# Patient Record
Sex: FEMALE | Race: WHITE | NOT HISPANIC OR LATINO | Employment: OTHER | URBAN - METROPOLITAN AREA
[De-identification: names, ages, dates, MRNs, and addresses within clinical notes are randomized per-mention and may not be internally consistent; named-entity substitution may affect disease eponyms.]

---

## 2021-04-06 ENCOUNTER — AMBULATORY - HEALTHEAST (OUTPATIENT)
Dept: SURGERY | Facility: CLINIC | Age: 71
End: 2021-04-06

## 2021-04-06 DIAGNOSIS — Z11.59 ENCOUNTER FOR SCREENING FOR OTHER VIRAL DISEASES: ICD-10-CM

## 2021-04-22 ENCOUNTER — RECORDS - HEALTHEAST (OUTPATIENT)
Dept: ADMINISTRATIVE | Facility: OTHER | Age: 71
End: 2021-04-22

## 2021-04-22 DIAGNOSIS — Z11.59 ENCOUNTER FOR SCREENING FOR OTHER VIRAL DISEASES: Primary | ICD-10-CM

## 2021-04-22 ASSESSMENT — MIFFLIN-ST. JEOR: SCORE: 1463.88

## 2021-04-25 DIAGNOSIS — Z11.59 ENCOUNTER FOR SCREENING FOR OTHER VIRAL DISEASES: ICD-10-CM

## 2021-04-25 LAB
SARS-COV-2 RNA RESP QL NAA+PROBE: NORMAL
SPECIMEN SOURCE: NORMAL

## 2021-04-25 PROCEDURE — U0003 INFECTIOUS AGENT DETECTION BY NUCLEIC ACID (DNA OR RNA); SEVERE ACUTE RESPIRATORY SYNDROME CORONAVIRUS 2 (SARS-COV-2) (CORONAVIRUS DISEASE [COVID-19]), AMPLIFIED PROBE TECHNIQUE, MAKING USE OF HIGH THROUGHPUT TECHNOLOGIES AS DESCRIBED BY CMS-2020-01-R: HCPCS | Performed by: ORTHOPAEDIC SURGERY

## 2021-04-25 PROCEDURE — U0005 INFEC AGEN DETEC AMPLI PROBE: HCPCS | Performed by: ORTHOPAEDIC SURGERY

## 2021-04-26 LAB
LABORATORY COMMENT REPORT: NORMAL
SARS-COV-2 RNA RESP QL NAA+PROBE: NEGATIVE
SPECIMEN SOURCE: NORMAL

## 2021-04-27 ENCOUNTER — COMMUNICATION - HEALTHEAST (OUTPATIENT)
Dept: SCHEDULING | Facility: CLINIC | Age: 71
End: 2021-04-27

## 2021-04-28 ENCOUNTER — RECORDS - HEALTHEAST (OUTPATIENT)
Dept: ADMINISTRATIVE | Facility: OTHER | Age: 71
End: 2021-04-28

## 2021-04-28 ENCOUNTER — SURGERY - HEALTHEAST (OUTPATIENT)
Dept: SURGERY | Facility: CLINIC | Age: 71
End: 2021-04-28
Payer: COMMERCIAL

## 2021-04-28 ENCOUNTER — ANESTHESIA - HEALTHEAST (OUTPATIENT)
Dept: SURGERY | Facility: CLINIC | Age: 71
End: 2021-04-28

## 2021-04-28 ASSESSMENT — MIFFLIN-ST. JEOR: SCORE: 1465.69

## 2021-06-05 VITALS — WEIGHT: 221 LBS | BODY MASS INDEX: 40.16 KG/M2

## 2021-06-05 VITALS — BODY MASS INDEX: 38.72 KG/M2 | HEIGHT: 63 IN

## 2021-06-05 VITALS — WEIGHT: 218.6 LBS | BODY MASS INDEX: 39.73 KG/M2

## 2021-06-05 VITALS — HEIGHT: 62 IN

## 2021-06-17 NOTE — ANESTHESIA CARE TRANSFER NOTE
Last vitals:   Vitals:    04/28/21 1316   BP: 98/54   Pulse: 68   Resp: 14   Temp: 36.5  C (97.7  F)   SpO2: 96%     Patient's level of consciousness is awake  Spontaneous respirations: yes  Maintains airway independently: yes  Dentition unchanged: yes  Oropharynx: oropharynx clear of all foreign objects    QCDR Measures:  ASA# 20 - Surgical Safety Checklist: WHO surgical safety checklist completed prior to induction    PQRS# 430 - Adult PONV Prevention: 4558F - Pt received => 2 anti-emetic agents (different classes) preop & intraop  ASA# 8 - Peds PONV Prevention: 4558F - Pt received => 2 anti-emetic agents (different classes) preop & intraop  PQRS# 424 - Rosy-op Temp Management: 4559F - At least one body temp DOCUMENTED => 35.5C or 95.9F within required timeframe  PQRS# 426 - PACU Transfer Protocol: - Transfer of care checklist used  ASA# 14 - Acute Post-op Pain: ASA14B - Patient did NOT experience pain >= 7 out of 10

## 2021-06-17 NOTE — ANESTHESIA PREPROCEDURE EVALUATION
Anesthesia Evaluation      Patient summary reviewed   No history of anesthetic complications     Airway   Mallampati: II  Neck ROM: full   Pulmonary - negative ROS and normal exam    breath sounds clear to auscultation                         Cardiovascular - negative ROS and normal exam  (+) hypertension, , hypercholesterolemia,     Rhythm: regular  Rate: normal,         Neuro/Psych - negative ROS     Endo/Other - negative ROS   (+) obesity,      GI/Hepatic/Renal - negative ROS           Dental - normal exam                        Anesthesia Plan  Planned anesthetic: spinal    Chart reviewed. Patient examined.    Spinal block procedure and its risks, including, but not limited to headache, nerve damage, bleeding, infection, back pain, low blood pressure, cardiorespiratory arrest, and block failure were discussed fully with patient.  Opportunity for questions given.   Informed consent obtained. Desires to proceed.  Routine sedation meds.       ASA 3     Anesthetic plan and risks discussed with: patient

## 2021-06-17 NOTE — ANESTHESIA POSTPROCEDURE EVALUATION
Patient: Luli Hobbs  Procedure(s):  RIGHT TOTAL HIP ARTHROPLASTY DIRECT ANTERIOR APPROACH (Right)  Anesthesia type: spinal    Patient location: PACU  Last vitals:   Vitals Value Taken Time   /72 04/28/21 1350   Temp 36.5  C (97.7  F) 04/28/21 1316   Pulse 60 04/28/21 1354   Resp 9 04/28/21 1354   SpO2 94 % 04/28/21 1354   Vitals shown include unvalidated device data.  Post vital signs: stable  Level of consciousness: awake and responds to simple questions  Post-anesthesia pain: pain controlled  Post-anesthesia nausea and vomiting: no  Pulmonary: unassisted, return to baseline  Cardiovascular: stable and blood pressure at baseline  Hydration: adequate  Anesthetic events: no

## 2021-06-17 NOTE — ANESTHESIA PROCEDURE NOTES
Spinal Block    Patient location during procedure: OR  Start time: 4/28/2021 11:41 AM  End time: 4/28/2021 11:43 AM  Reason for block: primary anesthetic    Staffing:  Performing  Anesthesiologist: Wan Christianosn MD    Preanesthetic Checklist  Completed: patient identified, risks, benefits, and alternatives discussed, timeout performed, consent obtained, at patient's request, airway assessed, oxygen available, suction available, emergency drugs available and hand hygiene performed  Spinal Block  Patient position: sitting  Prep: ChloraPrep  Patient monitoring: blood pressure, continuous pulse ox, cardiac monitor and heart rate  Approach: midline  Location: L3-4  Injection technique: single-shot  Needle type: pencil-tip   Needle gauge: 24 G

## 2022-01-12 VITALS — HEIGHT: 63 IN | BODY MASS INDEX: 38.73 KG/M2 | WEIGHT: 218.6 LBS

## 2022-05-09 ENCOUNTER — TELEPHONE (OUTPATIENT)
Dept: OTHER | Facility: CLINIC | Age: 72
End: 2022-05-09
Payer: COMMERCIAL

## 2022-05-09 NOTE — TELEPHONE ENCOUNTER
[facility]    Who is the name of the provider?:  New      What is the location you see this provider at?: [place]    Reason for call:  Requesting new appointment with Dr. Doe for lipedema.      Can we leave a detailed message on this number?  YES

## 2022-05-09 NOTE — TELEPHONE ENCOUNTER
Please arrange for new patient consult with Dr. Doe at next available.    Appt Note:  Self-referral for lipedema.

## 2022-05-10 NOTE — TELEPHONE ENCOUNTER
Future Appointments   Date Time Provider Department Center   6/2/2022 11:00 AM Ben Doe MD [facility]

## 2022-06-02 ENCOUNTER — OFFICE VISIT (OUTPATIENT)
Dept: OTHER | Facility: CLINIC | Age: 72
End: 2022-06-02
Attending: INTERNAL MEDICINE
Payer: COMMERCIAL

## 2022-06-02 VITALS
HEART RATE: 73 BPM | BODY MASS INDEX: 39.23 KG/M2 | HEIGHT: 63 IN | WEIGHT: 221.4 LBS | SYSTOLIC BLOOD PRESSURE: 132 MMHG | OXYGEN SATURATION: 97 % | DIASTOLIC BLOOD PRESSURE: 84 MMHG

## 2022-06-02 DIAGNOSIS — I10 BENIGN ESSENTIAL HYPERTENSION: ICD-10-CM

## 2022-06-02 DIAGNOSIS — I89.0 LYMPHEDEMA: ICD-10-CM

## 2022-06-02 DIAGNOSIS — E66.812 CLASS 2 OBESITY WITHOUT SERIOUS COMORBIDITY WITH BODY MASS INDEX (BMI) OF 39.0 TO 39.9 IN ADULT, UNSPECIFIED OBESITY TYPE: ICD-10-CM

## 2022-06-02 DIAGNOSIS — I83.893 VARICOSE VEINS OF BILATERAL LOWER EXTREMITIES WITH OTHER COMPLICATIONS: ICD-10-CM

## 2022-06-02 DIAGNOSIS — R60.9 LIPEDEMA: Primary | ICD-10-CM

## 2022-06-02 PROCEDURE — 99205 OFFICE O/P NEW HI 60 MIN: CPT | Performed by: INTERNAL MEDICINE

## 2022-06-02 PROCEDURE — G0463 HOSPITAL OUTPT CLINIC VISIT: HCPCS

## 2022-06-02 RX ORDER — GLUCOSAMINE/CHONDR SU A SOD 750-600 MG
2 TABLET ORAL DAILY
COMMUNITY

## 2022-06-02 RX ORDER — METOPROLOL SUCCINATE 200 MG/1
200 TABLET, EXTENDED RELEASE ORAL DAILY
COMMUNITY
Start: 2022-02-03

## 2022-06-02 RX ORDER — ACETAMINOPHEN 325 MG/1
325-650 TABLET ORAL
COMMUNITY
Start: 2021-04-28

## 2022-06-02 RX ORDER — TRAMADOL HYDROCHLORIDE 50 MG/1
25-50 TABLET ORAL
COMMUNITY
Start: 2021-04-29

## 2022-06-02 RX ORDER — PANTOPRAZOLE SODIUM 40 MG/1
40 TABLET, DELAYED RELEASE ORAL
COMMUNITY
Start: 2022-05-06

## 2022-06-02 RX ORDER — TRIAMTERENE/HYDROCHLOROTHIAZID 37.5-25 MG
1 TABLET ORAL EVERY MORNING
COMMUNITY
Start: 2022-02-03

## 2022-06-02 RX ORDER — ROSUVASTATIN CALCIUM 5 MG/1
5 TABLET, COATED ORAL DAILY
COMMUNITY
Start: 2022-02-03

## 2022-06-02 NOTE — PATIENT INSTRUCTIONS
Please see edema therapist referral done for MLD, Compression, velcro wraps etc     Paleo diet  /anti inflammatory diet    Lymphatic yoga, pool , aquatic exercises, pilates, walk etc    Lose weight  aim for 2-3 pounds per month     Please see sleep clinic and undergo formal sleep study     Take supplements , multivitamin, Vitamin D 2000 units , selenium one pill a day OTC     RTC 6 months

## 2022-06-02 NOTE — PROGRESS NOTES
"Patient is here to discuss self-referral for lipedema.    /84 (BP Location: Right arm, Patient Position: Chair, Cuff Size: Adult Large)   Pulse 73   Ht 5' 3\" (1.6 m)   Wt 221 lb 6.4 oz (100.4 kg)   SpO2 97%   BMI 39.22 kg/m      Questions patient would like addressed today are: N/A.    Refills are needed: N/A    Has homecare services and agency name:  Tena Goodwin  "

## 2022-06-02 NOTE — PROGRESS NOTES
[facility] INITIAL VASCULAR MEDICINE CONSULT    ( New patient visit)       PRIMARY HEALTH CARE PROVIDER:  Rica Christianson MD      REFERRING HEALTH CARE PROVIDER;  Rica Christianson MD      REASON FOR CONSULT: Evaluation and management of Lipedema.      HPI: Luli Hobbs is a 72 year old very pleasant female with past medical history of obesity, bilateral lower extremity varicose veins left worse than right side underwent RF ablation at [facility] few years ago, hypertension, history of breast cancer status postlumpectomy then followed by radiation, chronic back issues gives a history of bilateral leg heaviness, fatigue, tiredness.    She noticed disproportionately larger thighs, buttocks compared to rest of the body starting early 20s and she has no children and she progressively gained weight and again body habitus changed after menopause and now progressively worsening size, she etc.    Few years ago she was seen in by a vascular specialist at the [facility] system underwent venous duplex no DVT underwent venous competency studies then followed by SSV/varicose vein ablation of left lower extremity.  She was given compression stockings.  Her upper arms also increased size with the saggy skin and having difficulty in muscle toning.  Small tender nodules in upper arms lower abdomen, thighs hips and buttocks area.    She also has a sleep issues but no formal sleep study was done    No history of for DVT    She is new to this clinic reviewed available extensive records in the [facility] Care Everywhere and updated chart      PAST MEDICAL HISTORY  Past Medical History:   Diagnosis Date     Allergic rhinitis      Allergic rhinitis, cause unspecified      Arthritis      Breast cancer (H) 01/01/2000     Esophageal stricture      Hypertension      Lipodermatosclerosis of left lower extremity      Lymphedema      Malignant neoplasm of breast (female), unspecified site 01/01/2000    had  left lumpectomy and radiation     Morbid obesity (H)      Multiple environmental allergies     Autoimmune disorder causing hypersensitivity     Obesity      Peptic ulcer      Spondylolysis     lumbar     Varicose vein of leg        CURRENT MEDICATIONS  acetaminophen (TYLENOL) 325 MG tablet, Take 325-650 mg by mouth 1 capsule daily.  CALCIUM 500 + D 500-200 MG-IU OR TABS, one daily  cholecalciferol 25 MCG (1000 UT) TABS, Take 2,000 Units by mouth  Ginkgo Biloba 120 MG TABS, Take 2 tablets by mouth daily  HEMP OIL OR EXTRACT OR OTHER CBD CANNABINOID, NOT MEDICAL CANNABIS,, Take 25 mg by mouth As needed  metoprolol succinate ER (TOPROL XL) 200 MG 24 hr tablet, Take 200 mg by mouth  MULTIVITAMIN/MULTIMINERAL TABS   OR, 1 TABLET DAILY  OMEGA 3 550 MG OR CAPS, takes one daily  pantoprazole (PROTONIX) 40 MG EC tablet, Take 40 mg by mouth As needed  rosuvastatin (CRESTOR) 5 MG tablet,   ST FULLER WORT 350 MG OR CAPS, unsure of mg strength, takes one daily  traMADol (ULTRAM) 50 MG tablet, Take 25-50 mg by mouth  triamterene-HCTZ (MAXZIDE-25) 37.5-25 MG tablet, Take 1 tablet by mouth every morning  Turmeric Curcumin 500 MG CAPS, Take 1 capsule by mouth  VITAMIN E 200 UNIT OR CAPS, takes one daily  ZYRTEC D, ONE BID    No current facility-administered medications on file prior to visit.      PAST SURGICAL HISTORY:  Past Surgical History:   Procedure Laterality Date     ABLATION SAPHENOUS VEIN W/ RFA Left 2019     CL AFF SURGICAL PATHOLOGY  1997    lipoma from abdomen removed     ESOPHAGOSCOPY, GASTROSCOPY, DUODENOSCOPY (EGD), COMBINED  2019     FOOT ARTHRODESIS, SUBTALAR       HC EXCISION BREAST LESION, OPEN >=1  2000     HC RECONSTR NOSE+SIVAKUMAR SEPTAL REPAIR  2003    cleaned out sinus passages     INSERT INTRACORONARY STENT Right 2018    Right Thigh     LUMPECTOMY BREAST       RELEASE CARPAL TUNNEL Bilateral      SEPTOPLASTY       TONSILLECTOMY       TONSILLECTOMY       ZZC FOOT/TOES SURGERY PROC UNLISTED  2000    bone spur  removed, toe straightened     [facility] TOTAL HIP ARTHROPLASTY Right 2021    Procedure: RIGHT TOTAL HIP ARTHROPLASTY DIRECT ANTERIOR APPROACH;  Surgeon: Gomez Barnett MD;  Location: [facility] Main OR;  Service: Orthopedics     RUST NCS MOTOR W/O F-WAVE, EACH NERVE      bilateral       ALLERGIES     Allergies   Allergen Reactions     Codeine Nausea and Vomiting       FAMILY HISTORY  Family History   Problem Relation Age of Onset     Musculoskeletal Disorder Father         dementia     Cancer Maternal Grandfather         skin cancer       VASCULAR FAMILY HISTORY  1st order relative with atherosclerotic PAD: No  1st order relative with AAA: No  Family history of Familial Hyperlipidemia No  Family History of Hypercoagulable state:No    VASCULAR RISK FACTORS  1. Diabetes:No   2. Smoking: quit smoking some time ago.  3. HTN: controlled  4.Hyperlipidemia: Yes - controlled      SOCIAL HISTORY  Social History     Socioeconomic History     Marital status: Single     Spouse name: Not on file     Number of children: 0     Years of education: Not on file     Highest education level: Not on file   Occupational History     Occupation: artist      Employer: [employer]   Tobacco Use     Smoking status: Former Smoker     Packs/day: 1.50     Years: 2.00     Pack years: 3.00     Types: Cigarettes     Quit date: 1972     Years since quittin.4     Smokeless tobacco: Never Used   Substance and Sexual Activity     Alcohol use: Yes     Alcohol/week: 14.0 standard drinks     Comment: occ     Drug use: Never     Sexual activity: Not Currently   Other Topics Concern     Not on file   Social History Narrative     Not on file     Social Determinants of Health     Financial Resource Strain: Not on file   Food Insecurity: Not on file   Transportation Needs: Not on file   Physical Activity: Not on file   Stress: Not on file   Social Connections: Not on file   Intimate Partner Violence: Not on file   Housing  "Stability: Not on file       ROS:   General: No change in weight, sleep or appetite.  Normal energy.  No fever or chills  Chronic sleep issues, she lives alone, she thinks she snores probably due to congestion?   She has a daytime fatigue tiredness  Eyes: Negative for vision changes or eye problems  ENT: No problems with ears, nose or throat.  No difficulty swallowing.  Resp: No coughing, wheezing or shortness of breath  CV: No chest pains or palpitations  GI: No nausea, vomiting,  heartburn, abdominal pain, diarrhea, constipation or change in bowel habits  : No urinary frequency or dysuria, bladder or kidney problems  Musculoskeletal: Disproportionately larger thighs, buttocks, arms compared to rest of the body and it is progressively worsening, both legs feels heavy, fatigue and tired  Neurologic: No headaches, numbness, tingling, weakness, problems with balance or coordination  Psychiatric: No problems with anxiety, depression or mental health  Heme/immune/allergy: No history of bleeding or clotting problems or anemia.  No allergies or immune system problems  Endocrine: No history of thyroid disease, diabetes or other endocrine disorders  Skin: No rashes,worrisome lesions or skin problems  Vascular: Bilateral thighs, buttocks, legs disproportionately larger and feels heavy, tight and tired  History of varicose veins underwent left leg venous ablation few years ago  No history of DVT    EXAM:  /84 (BP Location: Right arm, Patient Position: Chair, Cuff Size: Adult Large)   Pulse 73   Ht 5' 3\" (1.6 m)   Wt 221 lb 6.4 oz (100.4 kg)   SpO2 97%   BMI 39.22 kg/m    In general, the patient is a pleasant female in no apparent distress.    HEENT: NC/AT.  PERRLA.  EOMI.  Sclerae white, not injected.  Nares clear.  Pharynx without erythema or exudate.  Dentition intact.    Neck: No adenopathy.  No thyromegaly. Carotids +2/2 bilaterally without bruits.  No jugular venous distension.   Heart: RRR. Normal S1, S2 " splits physiologically. No murmur, rub, click, or gallop. The PMI is in the 5th ICS in the midclavicular line. There is no heave.    Lungs: CTA.  No ronchi, wheezes, rales.  No dullness to percussion.   Abdomen: Soft, nontender, nondistended. No organomegaly. No AAA.  No bruits.   Extremities: Vascular:  She has a bilateral lower extremity varicose veins CEAP 3 CVI  Classical features of stage 2-3 Lipedema with lymphedema and venous insufficiency  Her swelling stops mainly at the wrist level bilaterally and also ankle level, positive cuff sign  Squaring of the toes, negative stemmers sign  No foot ulcers or leg ulcers  Palpable symmetrical peripheral pulses and no signs of arterial insufficiency    Labs:  LIPID RESULTS:  Lab Results   Component Value Date    CHOL 258 (H) 04/18/2006    HDL 67 04/18/2006     (H) 04/18/2006    TRIG 115 04/18/2006    CHOLHDLRATIO 3.8 04/18/2006         CBC RESULTS:  Lab Results   Component Value Date    HGB 11.7 (L) 04/28/2021       BMP RESULTS:  Lab Results   Component Value Date     04/18/2006    POTASSIUM 3.6 04/18/2006    CHLORIDE 102 04/18/2006    CO2 24 04/18/2006    ANIONGAP 13 04/18/2006     04/18/2006    BUN 12 04/18/2006    CR 0.80 04/18/2006    GFRESTIMATED 78 04/18/2006    GFRESTBLACK >90 04/18/2006    KEISHA 9.6 04/18/2006          THYROID RESULTS:  Lab Results   Component Value Date    TSH 2.73 04/18/2006       Procedures:   Impression         1.  Negative for DVT or EHIT.   2.  Successful closure of the left small saphenous vein.     Valeria Land MD   Vascular Surgery   JNONI/sls   D: 4/2/2019  T: 4/2/2019       This study was performed and interpreted by [facility]'s Vascular   Laboratory, a service accredited by the Intersocietal Accreditation   Commission (IAC) Vascular Testing/ICAVL, www.intersocietal.org/vascular.      Narrative    [facility]   [address]  [id number]      [email or web address]     VASCULAR ULTRASOUND REPORT     PATIENT NAME:  Luli Bazzi   YOB: 1950   IDENTIFICATION #:  [id number]   REFERRED BY:  Anselmo Iverson MD   LOCATION:  [facility]   DATE OF EXAM:  2019     NAME OF EXAM:  LEFT LOWER EXTREMITY VENOUS INSUFFICIENCY DUPLEX, LIMITED     CLINICAL HISTORY:  Follow up of left small saphenous vein RF ablation.     TECHNIQUE:  The left lower extremity veins were examined with gray-scale   ultrasound, color-flow and Doppler spectral analysis.  Compressibility of   veins by transducer pressure was used to evaluate presence/absence of   DVT/SVT at sites per exam specific protocol.  Assessment of venous flow   and competence was performed by Doppler spectral analysis and documented   at exam specific sites.  Venous insufficiency studies were performed with   the patient in an upright position.  Vein diameters were measured in   millimeters (mm) and reflux times if present were measured in seconds by   caliper method.     FINDINGS:  The popliteal vein on the left is patent with no evidence of   thrombus or EHIT. The left small saphenous vein is closed from the   junction to the mid distal calf.      ECHOCARDIOGRAM       LULI BAZZI   MRN:    [id number]         Accession#:   [id number]   :    1950 69 years Study Date:   2019 2:42:41 PM   Gender: F                  BP:           0/0 mmHg   Height: 160.00 cm          BSA:          1.98 mÂ    Weight: 96.00 kg           Tech:         VIPIN                              Referring MD: CECILE ALBERTO   Site:             [facility]   Reading Location: anw       Procedure: 2D, Color Doppler and Spectral Doppler.     Indication for study: Lower extremity edema   Cardiac Rhythm: Normal sinus.Study quality: Fair.     Final Impressions:      1. Normal left ventricular size, mildly increased wall thickness, normal global systolic function, calculated EF of 67 %.    2. No significant  valve disease detected.    3. The mitral valve is normal, mild mitral regurgitation.     Chamber Sizes and Function   Normal left ventricular size, mildly increased wall thickness, normal global systolic function, calculated EF of 67 %. Left atrial size is normal. Left atrial pressure is normal. Right ventricular cavity size is normal, global systolic RV function is normal. RV wall thickness is normal. The right atrium is normal. Right atrial volume index is 22 ml/mÂ . Right atrial area is 16 cmÂ . The pulmonary artery is of normal size and origin. The sinus of Valsalva is normal sized. The ascending aorta is normal sized. The aortic arch is normal.     Valves, RV Pressures and Diastolic Function     The aortic valve is normal in structure and trileaflet, no stenosis and no regurgitation. The mitral valve is normal in structure, mild mitral regurgitation. Normal diastolic function. The tricuspid valve is normal in structure. Tricuspid regurgitation is trace. The tricuspid regurgitant velocity is 2.0 m/s, the estimated right ventricular systolic pressure is 16 mmHg plus right atrial pressure. There is normal estimated pulmonary pressure by tricuspid regurgitation velocity and right atrial pressure. The pulmonic valve is normal. Trace pulmonic regurgitation.     Masses, Effusion, Shunts   There is no pericardial effusion. The inferior vena cava is normal sized, respiratory size variation greater than 50%. No left to right shunting was detected by limited color flow Doppler interrogation of the interatrial septum.     MEASUREMENTS AND CALCULATIONS     2-D Measurements and LV Function:     LVID (d) 4.0 cm Planimetered EF 67 %   LVID (s) 2.6 cm LV FS% (2D)     35 %   IVS (d)  1.3 cm LVOT diameter   1.8 cm   LVPW (d) 1.3 cm HR              84 bpm   Ao Sinus 3.3 cm LA Vol index    31 ml/m2   Asc Ao   3.5 cm RA Vol index    22 ml/m2   LA       3.7 cm RA area         16 cmÂ      Diastology:   Mitral          Tissue Doppler           Pulmonary veins   E Peak 1.1 m/s  e', Septum     0.09 m/s Pulm s          41.0 cm/s   A Peak 1.0 m/s  e', Lateral    0.09 m/s Pulm d          32.3 cm/s   E/A    1.1      E/e' Average   12.29    Pulm s/d ratio  1.27   DT     193 msec     Aortic Valve:     Vmax       1.9 m/s  KESHAWN (V)   1.82 cmÂ    VTI        0.37 m   KESHAWN (I)   1.80 cmÂ    LVOT V max 1.4 m/s  Max PG    15 mmHg   LVOT VTI   0.26 m   Mean PG   8 mmHg   SV         67 ml    Dim Index 0.71   SV index   34 ml/mÂ  CO        5.6 l/min                       CI        2.8 l/min/mÂ      Mitral Valve:     MVA      3.9 cmÂ    MV P 1/2 56 msec     Tricuspid Valve and estimated PA pressures:   TR Vmax 2.0 m/s TAPSE 3.1 cm   TR maxG 16 mmHg    Pulmonic Valve:   PV AT 90 msec       Electronically signed by Matty Nelson MD on 6/27/2019 5:09:20 PM.          Assessment and Plan:           1. 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 Lymphedema  3. Varicose veins of bilateral lower extremities with other complications s/p Left SSV RF,s/p  ablation 2019 at [facility] now CEAP 3 CVI    4. Class 2 obesity without serious comorbidity with body mass index (BMI) of 39.0 to 39.9 in adult, unspecified obesity type    This is a very pleasant 72-year-old female with history of multiple medical problems as delineated in HPI experiencing bilateral disproportionately large thighs, buttocks, upper arms with ongoing progressively worsening leg fatigue, tiredness, tightness and heaviness.  Her history and exam consistent with lipedema stage 2-3 with positive cuff sign and in fact triple disease ( Lipo-Phlebo lymphedema)  She has a known history of a bilateral lower extremity varicose veins and previously underwent left-sided leg venous ablation.  No history of a DVT.   She also has a features of lymphedema in both legs, squaring of the toes etc.  Many years ago she was seen and evaluated by edema therapist and currently using compression.    I had a lengthy discussion with  the patient regarding Lipedema and in her case complicated by venous insufficiency and lymphedema options of treatment and improving the lifestyle etc.    I have shared above diagram with the patient and suggested multimodality approach.  She took picture on her phone.    I will arrange referral to see edema therapist for compression, Velcro wraps, MLD, vibration,  possible pump therapy etc.    Anti-inflammatory/paleo diet suggested    Lymphatic yoga, aquatic exercises,  Pilates cycling etc.    Supplementation with multivitamin, vitamin C, vitamin D, selenium etc.    Go for formal sleep evaluation if documented sleep apnea consider using the CPAP machine or mouthguard if that is an option    Continues to do muscle toning exercises specifically quadriceps and upper arms area    Lose 2 to 3 pounds per month, consider intermittent fasting    We also discussed liposuction pros and cons.  She is not a good candidate for liposuction given triple disease and her age .     AVS with written instructions given    Follow-up in 6 months    - Lymphedema Therapy Referral; Future      More than 60 minutes spent on the date of the encounter doing chart review, history and exam, documentation, and further activities as noted above.  She is new to this clinic , reviewed available out side records in [facility] everywhere and updated chart     AVS with written instructions given.      Ben Doe MD, FAHA, FSVM, FNLA, FACP  Vascular Medicine  Clinical Lipidologist  Clinical Hypertension specialist

## 2022-09-02 ENCOUNTER — HOSPITAL ENCOUNTER (OUTPATIENT)
Dept: OCCUPATIONAL THERAPY | Facility: CLINIC | Age: 72
Discharge: HOME | End: 2022-09-02
Attending: INTERNAL MEDICINE
Payer: COMMERCIAL

## 2022-09-02 DIAGNOSIS — R60.9 LIPEDEMA: ICD-10-CM

## 2022-09-02 DIAGNOSIS — I89.0 LYMPHEDEMA: ICD-10-CM

## 2022-09-02 PROCEDURE — 97165 OT EVAL LOW COMPLEX 30 MIN: CPT | Mod: GO | Performed by: OCCUPATIONAL THERAPIST

## 2022-09-02 PROCEDURE — 97140 MANUAL THERAPY 1/> REGIONS: CPT | Mod: GO | Performed by: OCCUPATIONAL THERAPIST

## 2022-09-04 NOTE — ADDENDUM NOTE
Encounter addended by: Liang Gutierrez on: 9/4/2022 9:17 AM   Actions taken: Flowsheet accepted, Clinical Note Signed

## 2022-09-04 NOTE — PROGRESS NOTES
[facility]        OUTPATIENT OCCUPATIONAL THERAPY EDEMA EVALUATION  PLAN OF TREATMENT FOR OUTPATIENT REHABILITATION  (COMPLETE FOR INITIAL CLAIMS ONLY)  Patient's Last Name, First Name, Luli Patel                           Provider s Name:   [facility] Medical Record No.  [id number]     Start of Care Date:  09/02/22   Onset Date:  06/02/22 (chronic)   Type:  OT   Medical Diagnosis:  lymphedema   Therapy Diagnosis:  lymphedema Visits from SOC:  1                                     __________________________________________________________________________________   Plan of Treatment/Functional Goals:    Manual lymph drainage, Gradient compression bandaging, Fit for compression garment, Exercises, Precautions to prevent infection / exacerbation, Education, Skin care / precautions, Home management program development        GOALS  1. Goal description: Pt will report understandign differences between primary lymphedema/lipedema vs secondary lymphedema/phlebolymphedema vs cellulitis for safety and I with home management BLE lymphedema/lipedema       Target date: 11/25/22  2. Goal description: Tolerate gradient compression bandaging/wearing compression garments 23 hrs/day to prevent re-acccumulation of extracellular fluid for max reducitons needed to reduce risk infections and promote improvements in walking/mobility tasks       Target date: 11/25/22  3. Goal description: Demonstrate independence in applying gradient compression bandages for I building with home management of BLE lymphedema needed to aide improveemnts in mobility engagement       Target date: 11/25/22  4. Goal description: Demonstrate independence in performing prescribed exercises to facilate the lymph system and muscle pumping systems for max reducitons needed to reduce risk infections and  promote improvements in walking/mobility tasks       Target date: 11/25/22  5. Goal description: Be independent in donning/doffing, wearing schedule, and care of compression garments for I building with home management of BLE lymphedema needed to aide improveemnts in mobility engagement       Target date: 11/25/22  6. Goal description: Pt will display a total BLE volume reduciton of 1L+ for reducitons needed to reduce weight in legs for improved walking       Target date: 11/25/22     7.             8.              Treatment Frequency: 3x/week   Treatment duration: 3x/wk x 5 weeks, then 0x/wk x 4 weeks, then 1x/wk x 1 week    Liang Gutierrez                                    I CERTIFY THE NEED FOR THESE SERVICES FURNISHED UNDER        THIS PLAN OF TREATMENT AND WHILE UNDER MY CARE     (Physician co-signature of this document indicates review and certification of the therapy plan).                   Certification date from: 09/02/22       Certification date to: 11/25/22           Referring physician: Ben Doe   Initial Assessment  See Epic Evaluation- Start of care: 09/02/22

## 2022-09-04 NOTE — PROGRESS NOTES
09/02/22 1600   Quick Adds   Quick Adds Certification   Rehab Discipline   Discipline OT   Type of Visit   Type of visit Initial Edema Evaluation   General Information   Start of care 09/02/22   Referring physician Ben Doe   Orders Evaluate and treat as indicated   Order date 06/02/22   Medical diagnosis lymphedema; phlebolymphedema   Onset of illness / date of surgery 06/02/22  (chronic)   Edema onset 06/02/22  (chronic)   Affected body parts RLE;LLE  (less involved BUE and trunk)   Edema etiology Congenital;Chronic Venous Insufficiency   Edema etiology comments Pts lymphedema seesm related to primary componenet as she has mother with similiar issues and she shows s/s lipedema componenet as well. History LLE ablation so known venous componenet to pts BLE lymphedema as well.   Pertinent history of current problem (PT: include personal factors and/or comorbidities that impact the POC; OT: include additional occupational profile info) PMH significant for bowel and bladder issues, breast cancer treated with lumpectomy and radiation and no development UE or trunkal lymphedema, CVI, HTN, joint replaceent surgery, neuropathy symptoms reported, vision and hearing loss, and weakness and energy loss reported. Pt reports many allergies and autoimmune disorder.   Surgical / medical history reviewed Yes   Edema special tests Ultrasound   Prior level of functional mobility I   Prior treatment Compression garments;MLD;Gradient compression bandaging;Elevation;Exercise   Community support   (none)   Patient role / employment history Retired   Living environment [place] / [facility]   Fall Risk Screen   Fall screen completed by OT   Have you fallen 2 or more times in the past year? No   Have you fallen and had an injury in the past year? No   Is patient a fall risk? No   Abuse Screen (yes response referral indicated)   Feels Unsafe at Home or Work/School no   Feels Threatened by Someone no   Does Anyone Try to Keep You From  Having Contact with Others or Doing Things Outside Your Home? no   Physical Signs of Abuse Present no   Patient needs abuse support services and resources No   System Outcome Measures   Lymphedema Life Impact Scale (score range 0-72). A higher score indicates greater impairment.   (pt will return form next session)   Subjective Report   Patient report of symptoms heavy legs; losing energy with walking and activity   Patient / Family Goals   Patient / family goals statement to reduce lymphedema in her legs   Pain   Patient currently in pain No   Pain comments Pt has tenderness and sensiticity in tissue LLE medial/posterior calf region   Cognitive Status   Orientation Orientation to person, place and time   Level of consciousness Alert   Follows commands and answers questions 100% of the time   Personal safety and judgement Intact   Edema Exam / Assessment   Skin condition Pitting;Non-pitting   Skin condition comments 1-2 pitting BLE ankle-mid calf; non pitting B thighs; non pittin gto 1+ B feet   Pitting 1+;2+   Pitting location ankle-mid calf BLE   Scar   (TKA scar-no concerns noted)   Capillary refill Symmetrical   Stemmer sign Negative   Stemmer sign comments portions B calves tighter and bridging on positive stemmer sign   Girth Measurements   Girth Measurements   (will obtain upon return for services)   Range of Motion   ROM comments BLE WFL   Strength   Strength comments NT'd   Activities of Daily Living   Activities of Daily Living I   Bed Mobility   Bed mobility I   Transfers   Transfers I   Gait / Locomotion   Gait / Locomotion I   Sensory   Sensory perception comments pt repoprts neuropathy symptoms on intake form   Vascular Assessment   Vascular Assessment Comments known CVI and ablation in LLE history   Coordination   Coordination Gross motor coordination appropriate   Muscle Tone   Muscle tone No deficits were identified   Planned Edema Interventions   Planned edema interventions Manual lymph  drainage;Gradient compression bandaging;Fit for compression garment;Exercises;Precautions to prevent infection / exacerbation;Education;Skin care / precautions;Home management program development   Clinical Impression   Criteria for skilled therapeutic intervention met Yes   Therapy diagnosis lymphedema   Influenced by the following impairments / conditions Phlebolymphedema;Lipolymphedema;Stage 1   Assessment of Occupational Performance 1-3 Performance Deficits   Identified Performance Deficits decreased fucntional mobility; decreased fit and I with LB cares   Clinical Decision Making (Complexity) Low complexity   Treatment Frequency 3x/week   Treatment duration 3x/wk x 5 weeks, then 0x/wk x 4 weeks, then 1x/wk x 1 week   Patient / family and/or staff in agreement with plan of care Yes   Risks and benefits of therapy have been explained Yes   Clinical impression comments Pt can benefit from skilled lymphedema services to reduce BLE lymphedema to aide improvements in walking and promote better fit and I with LB cares.   Goals   Edema Eval Goals 1;2;3;4;5;6   Goal 1   Goal identifier Ed   Goal description Pt will report understandign differences between primary lymphedema/lipedema vs secondary lymphedema/phlebolymphedema vs cellulitis for safety and I with home management BLE lymphedema/lipedema   Target date 11/25/22   Goal 2   Goal identifier GCB Wearing   Goal description Tolerate gradient compression bandaging/wearing compression garments 23 hrs/day to prevent re-acccumulation of extracellular fluid for max reducitons needed to reduce risk infections and promote improvements in walking/mobility tasks   Target date 11/25/22   Goal 3   Goal identifier GCB Donning   Goal description Demonstrate independence in applying gradient compression bandages for I building with home management of BLE lymphedema needed to aide improveemnts in mobility engagement   Target date 11/25/22   Goal 4   Goal identifier MLD/HEP   Goal  description Demonstrate independence in performing prescribed exercises to facilate the lymph system and muscle pumping systems for max reducitons needed to reduce risk infections and promote improvements in walking/mobility tasks   Target date 11/25/22   Goal 5   Goal identifier Garments   Goal description Be independent in donning/doffing, wearing schedule, and care of compression garments for I building with home management of BLE lymphedema needed to aide improveemnts in mobility engagement   Target date 11/25/22   Goal 6   Goal identifier Reductions   Goal description Pt will display a total BLE volume reduciton of 1L+ for reducitons needed to reduce weight in legs for improved walking   Target date 11/25/22   Total Evaluation Time   OT Eval, Low Complexity Minutes (89075) 30   Certification   Certification date from 09/02/22   Certification date to 11/25/22   Medical Diagnosis lymphedema   Certification I certify the need for these services furnished under this plan of treatment and while under my care.  (Physician co-signature of this document indicates review and certification of the therapy plan).

## 2022-10-28 ENCOUNTER — TRANSCRIBE ORDERS (OUTPATIENT)
Dept: OTHER | Age: 72
End: 2022-10-28

## 2022-10-28 DIAGNOSIS — Z87.09 HISTORY OF THROAT PROBLEM: Primary | ICD-10-CM

## 2022-11-07 ENCOUNTER — HOSPITAL ENCOUNTER (OUTPATIENT)
Dept: OCCUPATIONAL THERAPY | Facility: CLINIC | Age: 72
Discharge: HOME | End: 2022-11-07
Payer: COMMERCIAL

## 2022-11-07 DIAGNOSIS — R60.9 LIPEDEMA: Primary | ICD-10-CM

## 2022-11-07 DIAGNOSIS — I89.0 LYMPHEDEMA: ICD-10-CM

## 2022-11-07 PROCEDURE — 97140 MANUAL THERAPY 1/> REGIONS: CPT | Mod: GO | Performed by: OCCUPATIONAL THERAPIST

## 2022-11-09 ENCOUNTER — HOSPITAL ENCOUNTER (OUTPATIENT)
Dept: OCCUPATIONAL THERAPY | Facility: CLINIC | Age: 72
Discharge: HOME | End: 2022-11-09
Payer: COMMERCIAL

## 2022-11-09 DIAGNOSIS — R60.9 LIPEDEMA: Primary | ICD-10-CM

## 2022-11-09 DIAGNOSIS — I89.0 LYMPHEDEMA: ICD-10-CM

## 2022-11-09 PROCEDURE — 97140 MANUAL THERAPY 1/> REGIONS: CPT | Mod: GO | Performed by: OCCUPATIONAL THERAPIST

## 2022-11-11 ENCOUNTER — HOSPITAL ENCOUNTER (OUTPATIENT)
Dept: OCCUPATIONAL THERAPY | Facility: CLINIC | Age: 72
Discharge: HOME | End: 2022-11-11
Payer: COMMERCIAL

## 2022-11-11 DIAGNOSIS — I89.0 LYMPHEDEMA: ICD-10-CM

## 2022-11-11 DIAGNOSIS — R60.9 LIPEDEMA: Primary | ICD-10-CM

## 2022-11-11 PROCEDURE — 97140 MANUAL THERAPY 1/> REGIONS: CPT | Mod: GO | Performed by: OCCUPATIONAL THERAPIST

## 2022-12-29 ENCOUNTER — OFFICE VISIT (OUTPATIENT)
Dept: OTHER | Facility: CLINIC | Age: 72
End: 2022-12-29
Attending: INTERNAL MEDICINE
Payer: COMMERCIAL

## 2022-12-29 VITALS
DIASTOLIC BLOOD PRESSURE: 84 MMHG | BODY MASS INDEX: 37.56 KG/M2 | SYSTOLIC BLOOD PRESSURE: 130 MMHG | HEART RATE: 81 BPM | HEIGHT: 63 IN | OXYGEN SATURATION: 97 % | WEIGHT: 212 LBS

## 2022-12-29 DIAGNOSIS — E66.812 CLASS 2 OBESITY WITHOUT SERIOUS COMORBIDITY WITH BODY MASS INDEX (BMI) OF 39.0 TO 39.9 IN ADULT, UNSPECIFIED OBESITY TYPE: ICD-10-CM

## 2022-12-29 DIAGNOSIS — R60.9 LIPEDEMA: Primary | ICD-10-CM

## 2022-12-29 DIAGNOSIS — I10 BENIGN ESSENTIAL HYPERTENSION: ICD-10-CM

## 2022-12-29 DIAGNOSIS — I89.0 LYMPHEDEMA: ICD-10-CM

## 2022-12-29 DIAGNOSIS — I83.893 VARICOSE VEINS OF BILATERAL LOWER EXTREMITIES WITH OTHER COMPLICATIONS: ICD-10-CM

## 2022-12-29 PROCEDURE — G0463 HOSPITAL OUTPT CLINIC VISIT: HCPCS

## 2022-12-29 PROCEDURE — 99215 OFFICE O/P EST HI 40 MIN: CPT | Performed by: INTERNAL MEDICINE

## 2022-12-29 NOTE — PATIENT INSTRUCTIONS
Take Lymphatic formula as advised Rx given     Go for sleep evaluation    Follow edema therapist recommendations     Eat potasium rich foods like banana etc     RTC in 6 months

## 2022-12-29 NOTE — PROGRESS NOTES
"Patient is here to discuss follow up.    /84 (BP Location: Right arm, Patient Position: Chair, Cuff Size: Adult Large)   Pulse 81   Ht 5' 3\" (1.6 m)   Wt 212 lb (96.2 kg)   SpO2 97%   BMI 37.55 kg/m      Questions patient would like addressed today are: N/A.    Refills are needed: N/A    Has homecare services and agency name:  Tena Goodwin  "

## 2022-12-29 NOTE — PROGRESS NOTES
[facility] VASCULAR MEDICINE VISIT      PRIMARY HEALTH CARE PROVIDER:  Rica Christianson MD      REASON FOR VISIT  Follow up  Known Lipo-phlebo-lympmhedema  Seen by edema therapist  Hx of VV and previous ablation   Obesity, HTN etc   Planning for knee surgery in the future    HPI: Luli Hobbs is a 72 year old very pleasant female with past medical history of obesity, bilateral lower extremity varicose veins left worse than right side underwent RF ablation at [facility] few years ago, hypertension, history of breast cancer status postlumpectomy then followed by radiation, chronic back issues gives a history of bilateral leg heaviness, fatigue, tiredness.    She noticed disproportionately larger thighs, buttocks compared to rest of the body starting early 20s and she has no children and she progressively gained weight and again body habitus changed after menopause and now progressively worsening size, she etc.    Few years ago she was seen in by a vascular specialist at the [facility] system underwent venous duplex no DVT underwent venous competency studies then followed by SSV/varicose vein ablation of left lower extremity.  She was given compression stockings.  Her upper arms also increased size with the saggy skin and having difficulty in muscle toning.  Small tender nodules in upper arms lower abdomen, thighs hips and buttocks area.    No history of for DVT    Since last visit seen by edema therapist        PAST MEDICAL HISTORY  Past Medical History:   Diagnosis Date     Allergic rhinitis      Allergic rhinitis, cause unspecified      Arthritis      Breast cancer (H) 01/01/2000     Esophageal stricture      Hypertension      Lipodermatosclerosis of left lower extremity      Lymphedema      Malignant neoplasm of breast (female), unspecified site 01/01/2000    had left lumpectomy and radiation     Morbid obesity (H)      Multiple environmental allergies     Autoimmune  disorder causing hypersensitivity     Obesity      Peptic ulcer      Spondylolysis     lumbar     Varicose vein of leg        CURRENT MEDICATIONS  acetaminophen (TYLENOL) 325 MG tablet, Take 325-650 mg by mouth 1 capsule daily.  CALCIUM 500 + D 500-200 MG-IU OR TABS, one daily  cholecalciferol 25 MCG (1000 UT) TABS, Take 2,000 Units by mouth  Ginkgo Biloba 120 MG TABS, Take 2 tablets by mouth daily  HEMP OIL OR EXTRACT OR OTHER CBD CANNABINOID, NOT MEDICAL CANNABIS,, Take 25 mg by mouth As needed  metoprolol succinate ER (TOPROL XL) 200 MG 24 hr tablet, Take 200 mg by mouth  MULTIVITAMIN/MULTIMINERAL TABS   OR, 1 TABLET DAILY  OMEGA 3 550 MG OR CAPS, takes one daily  pantoprazole (PROTONIX) 40 MG EC tablet, Take 40 mg by mouth As needed  rosuvastatin (CRESTOR) 5 MG tablet,   ST FULLER WORT 350 MG OR CAPS, unsure of mg strength, takes one daily  traMADol (ULTRAM) 50 MG tablet, Take 25-50 mg by mouth  triamterene-HCTZ (MAXZIDE-25) 37.5-25 MG tablet, Take 1 tablet by mouth every morning  Turmeric Curcumin 500 MG CAPS, Take 1 capsule by mouth  VITAMIN E 200 UNIT OR CAPS, takes one daily  ZYRTEC D, ONE BID    No current facility-administered medications on file prior to visit.      PAST SURGICAL HISTORY:  Past Surgical History:   Procedure Laterality Date     ABLATION SAPHENOUS VEIN W/ RFA Left 2019     [facility] AFF SURGICAL PATHOLOGY  1997    lipoma from abdomen removed     ESOPHAGOSCOPY, GASTROSCOPY, DUODENOSCOPY (EGD), COMBINED  2019     FOOT ARTHRODESIS, SUBTALAR       HC EXCISION BREAST LESION, OPEN >=1  2000     HC RECONSTR NOSE+SIVAKUMAR SEPTAL REPAIR  2003    cleaned out sinus passages     INSERT INTRACORONARY STENT Right 2018    Right Thigh     LUMPECTOMY BREAST       RELEASE CARPAL TUNNEL Bilateral      SEPTOPLASTY       TONSILLECTOMY       TONSILLECTOMY       [facility] FOOT/TOES SURGERY PROC UNLISTED  2000    bone spur removed, toe straightened     [facility] TOTAL HIP ARTHROPLASTY Right 4/28/2021    Procedure: RIGHT TOTAL HIP  ARTHROPLASTY DIRECT ANTERIOR APPROACH;  Surgeon: Gomez Barnett MD;  Location: [facility] Main OR;  Service: Orthopedics     [facility] NCS MOTOR W/O F-WAVE, EACH NERVE  2003    bilateral       ALLERGIES     Allergies   Allergen Reactions     Codeine Nausea and Vomiting       FAMILY HISTORY  Family History   Problem Relation Age of Onset     Musculoskeletal Disorder Father         dementia     Cancer Maternal Grandfather         skin cancer       VASCULAR FAMILY HISTORY  1st order relative with atherosclerotic PAD: No  1st order relative with AAA: No  Family history of Familial Hyperlipidemia No  Family History of Hypercoagulable state:No    VASCULAR RISK FACTORS  1. Diabetes:No   2. Smoking: quit smoking some time ago.  3. HTN: controlled  4.Hyperlipidemia: Yes - controlled      SOCIAL HISTORY  Social History     Socioeconomic History     Marital status: Single     Spouse name: Not on file     Number of children: 0     Years of education: Not on file     Highest education level: Not on file   Occupational History     Occupation: artist      Employer: [employer]   Tobacco Use     Smoking status: Former     Packs/day: 1.50     Years: 2.00     Pack years: 3.00     Types: Cigarettes     Quit date: 1972     Years since quittin.0     Smokeless tobacco: Never   Substance and Sexual Activity     Alcohol use: Yes     Alcohol/week: 14.0 standard drinks     Types: 14 Standard drinks or equivalent per week     Comment: 1-2 glassesof wine daily.     Drug use: Never     Sexual activity: Not Currently   Other Topics Concern     Not on file   Social History Narrative     Not on file     Social Determinants of Health     Financial Resource Strain: Not on file   Food Insecurity: Not on file   Transportation Needs: Not on file   Physical Activity: Not on file   Stress: Not on file   Social Connections: Not on file   Intimate Partner Violence: Not on file   Housing Stability: Not on file       ROS:    General: No change in weight, sleep or appetite.  Normal energy.  No fever or chills  Chronic sleep issues, she lives alone, she thinks she snores probably due to congestion?   She has a daytime fatigue tiredness  Eyes: Negative for vision changes or eye problems  ENT: No problems with ears, nose or throat.  No difficulty swallowing.  Resp: No coughing, wheezing or shortness of breath  CV: No chest pains or palpitations  GI: No nausea, vomiting,  heartburn, abdominal pain, diarrhea, constipation or change in bowel habits  : No urinary frequency or dysuria, bladder or kidney problems  Musculoskeletal: Disproportionately larger thighs, buttocks, arms compared to rest of the body and it is progressively worsening, both legs feels heavy, fatigue and tired  Neurologic: No headaches, numbness, tingling, weakness, problems with balance or coordination  Psychiatric: No problems with anxiety, depression or mental health  Heme/immune/allergy: No history of bleeding or clotting problems or anemia.  No allergies or immune system problems  Endocrine: No history of thyroid disease, diabetes or other endocrine disorders  Skin: No rashes,worrisome lesions or skin problems  Vascular: Bilateral thighs, buttocks, legs disproportionately larger and feels heavy, tight and tired  History of varicose veins underwent left leg venous ablation few years ago  No history of DVT    EXAM:  There were no vitals taken for this visit.  In general, the patient is a pleasant female in no apparent distress.    HEENT: NC/AT.  PERRLA.  EOMI.  Sclerae white, not injected.  Nares clear.  Pharynx without erythema or exudate.  Dentition intact.    Neck: No adenopathy.  No thyromegaly. Carotids +2/2 bilaterally without bruits.  No jugular venous distension.   Heart: RRR. Normal S1, S2 splits physiologically. No murmur, rub, click, or gallop. The PMI is in the 5th ICS in the midclavicular line. There is no heave.    Lungs: CTA.  No ronchi, wheezes, rales.   No dullness to percussion.   Abdomen: Soft, nontender, nondistended. No organomegaly. No AAA.  No bruits.   Extremities: Vascular:  She has a bilateral lower extremity varicose veins CEAP 3 CVI  Classical features of stage 2-3 Lipedema with lymphedema and venous insufficiency  Her swelling stops mainly at the wrist level bilaterally and also ankle level, positive cuff sign  Squaring of the toes, negative stemmers sign  No foot ulcers or leg ulcers  Palpable symmetrical peripheral pulses and no signs of arterial insufficiency    Labs:  LIPID RESULTS:  Lab Results   Component Value Date    CHOL 258 (H) 04/18/2006    HDL 67 04/18/2006     (H) 04/18/2006    TRIG 115 04/18/2006    CHOLHDLRATIO 3.8 04/18/2006         CBC RESULTS:  Lab Results   Component Value Date    HGB 11.7 (L) 04/28/2021       BMP RESULTS:  Lab Results   Component Value Date     04/18/2006    POTASSIUM 3.6 04/18/2006    CHLORIDE 102 04/18/2006    CO2 24 04/18/2006    ANIONGAP 13 04/18/2006     04/18/2006    BUN 12 04/18/2006    CR 0.80 04/18/2006    GFRESTIMATED 78 04/18/2006    GFRESTBLACK >90 04/18/2006    KEISHA 9.6 04/18/2006          THYROID RESULTS:  Lab Results   Component Value Date    TSH 2.73 04/18/2006       Procedures:   Impression         1.  Negative for DVT or EHIT.   2.  Successful closure of the left small saphenous vein.     Valeria Land MD   Vascular Surgery   JNONI/sls   D: 4/2/2019  T: 4/2/2019       This study was performed and interpreted by [facility]'s Vascular   Laboratory, a service accredited by the Intersocietal Accreditation   Commission (IAC) Vascular Testing/ICAVL, www.intersocietal.org/vascular.      Narrative    [facility]   [address] [address] [phone]   [email or web address]     VASCULAR ULTRASOUND REPORT     PATIENT NAME:  Luli Hobbs   YOB: 1950   IDENTIFICATION #:  [id number]    REFERRED BY:  Anselmo Iverson MD   LOCATION:  [facility]   DATE OF EXAM:  2019     NAME OF EXAM:  LEFT LOWER EXTREMITY VENOUS INSUFFICIENCY DUPLEX, LIMITED     CLINICAL HISTORY:  Follow up of left small saphenous vein RF ablation.     TECHNIQUE:  The left lower extremity veins were examined with gray-scale   ultrasound, color-flow and Doppler spectral analysis.  Compressibility of   veins by transducer pressure was used to evaluate presence/absence of   DVT/SVT at sites per exam specific protocol.  Assessment of venous flow   and competence was performed by Doppler spectral analysis and documented   at exam specific sites.  Venous insufficiency studies were performed with   the patient in an upright position.  Vein diameters were measured in   millimeters (mm) and reflux times if present were measured in seconds by   caliper method.     FINDINGS:  The popliteal vein on the left is patent with no evidence of   thrombus or EHIT. The left small saphenous vein is closed from the   junction to the mid distal calf.      ECHOCARDIOGRAM       LIZBET BAZZI   MRN:    [id number]         Accession#:   [id number]   :    1950 69 years Study Date:   2019 2:42:41 PM   Gender: F                  BP:           0/0 mmHg   Height: 160.00 cm          BSA:          1.98 mÂ    Weight: 96.00 kg           Tech:         VIPIN                              Referring MD: CECILE ALBERTO   Site:             ANW   Reading Location: anwop       Procedure: 2D, Color Doppler and Spectral Doppler.     Indication for study: Lower extremity edema   Cardiac Rhythm: Normal sinus.Study quality: Fair.     Final Impressions:      1. Normal left ventricular size, mildly increased wall thickness, normal global systolic function, calculated EF of 67 %.    2. No significant valve disease detected.    3. The mitral valve is normal, mild mitral regurgitation.     Chamber Sizes and Function   Normal left ventricular size, mildly increased wall  thickness, normal global systolic function, calculated EF of 67 %. Left atrial size is normal. Left atrial pressure is normal. Right ventricular cavity size is normal, global systolic RV function is normal. RV wall thickness is normal. The right atrium is normal. Right atrial volume index is 22 ml/mÂ . Right atrial area is 16 cmÂ . The pulmonary artery is of normal size and origin. The sinus of Valsalva is normal sized. The ascending aorta is normal sized. The aortic arch is normal.     Valves, RV Pressures and Diastolic Function     The aortic valve is normal in structure and trileaflet, no stenosis and no regurgitation. The mitral valve is normal in structure, mild mitral regurgitation. Normal diastolic function. The tricuspid valve is normal in structure. Tricuspid regurgitation is trace. The tricuspid regurgitant velocity is 2.0 m/s, the estimated right ventricular systolic pressure is 16 mmHg plus right atrial pressure. There is normal estimated pulmonary pressure by tricuspid regurgitation velocity and right atrial pressure. The pulmonic valve is normal. Trace pulmonic regurgitation.     Masses, Effusion, Shunts   There is no pericardial effusion. The inferior vena cava is normal sized, respiratory size variation greater than 50%. No left to right shunting was detected by limited color flow Doppler interrogation of the interatrial septum.     MEASUREMENTS AND CALCULATIONS     2-D Measurements and LV Function:     LVID (d) 4.0 cm Planimetered EF 67 %   LVID (s) 2.6 cm LV FS% (2D)     35 %   IVS (d)  1.3 cm LVOT diameter   1.8 cm   LVPW (d) 1.3 cm HR              84 bpm   Ao Sinus 3.3 cm LA Vol index    31 ml/m2   Asc Ao   3.5 cm RA Vol index    22 ml/m2   LA       3.7 cm RA area         16 cmÂ      Diastology:   Mitral          Tissue Doppler          Pulmonary veins   E Peak 1.1 m/s  e', Septum     0.09 m/s Pulm s          41.0 cm/s   A Peak 1.0 m/s  e', Lateral    0.09 m/s Pulm d          32.3 cm/s   E/A     1.1      E/e' Average   12.29    Pulm s/d ratio  1.27   DT     193 msec     Aortic Valve:     Vmax       1.9 m/s  KESHAWN (V)   1.82 cmÂ    VTI        0.37 m   KESHAWN (I)   1.80 cmÂ    LVOT V max 1.4 m/s  Max PG    15 mmHg   LVOT VTI   0.26 m   Mean PG   8 mmHg   SV         67 ml    Dim Index 0.71   SV index   34 ml/mÂ  CO        5.6 l/min                       CI        2.8 l/min/mÂ      Mitral Valve:     MVA      3.9 cmÂ    MV P 1/2 56 msec     Tricuspid Valve and estimated PA pressures:   TR Vmax 2.0 m/s TAPSE 3.1 cm   TR maxG 16 mmHg    Pulmonic Valve:   PV AT 90 msec       Electronically signed by Matty Nelson MD on 6/27/2019 5:09:20 PM.          Assessment and Plan:           1. 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 Lymphedema  3. Varicose veins of bilateral lower extremities with other complications s/p Left SSV RF,s/p  ablation 2019 at [facility] now CEAP 3 CVI    4. Class 2 obesity without serious comorbidity with body mass index (BMI) of 39.0 to 39.9 in adult, unspecified obesity type    This is a very pleasant 72-year-old female with history of multiple medical problems as delineated in HPI experiencing bilateral disproportionately large thighs, buttocks, upper arms with ongoing progressively worsening leg fatigue, tiredness, tightness and heaviness.  Her history and exam consistent with lipedema stage 2-3 with positive cuff sign and in fact triple disease ( Lipo-Phlebo lymphedema)  She has a known history of a bilateral lower extremity varicose veins and previously underwent left-sided leg venous ablation.  No history of a DVT.   She also has a features of lymphedema in both legs, squaring of the toes etc.  Recently seen and evaluated by lymphedema therapist  She is in the process of going for GYN surgery and also wants to go for knee surgery in the future    I had a lengthy discussion with the patient during last visit and today regarding Lipedema and in her case complicated by venous  insufficiency and lymphedema options of treatment and improving the lifestyle etc.    I have shared again above diagram with the patient and suggested multimodality approach.     Follow  edema therapist recommendation compression, Velcro wraps, MLD, vibration,  possible pump therapy etc.    Anti-inflammatory/paleo diet suggested    Lymphatic yoga, aquatic exercises,  Pilates cycling etc.    Lymphatic formula supplementation Rx given     Go for formal sleep evaluation if documented sleep apnea consider using the CPAP machine or mouthguard if that is an option    Continues to do muscle toning exercises specifically quadriceps and upper arms area    Lose 2 to 3 pounds per month, consider intermittent fasting    We also discussed liposuction pros and cons.  She is not a good candidate for liposuction given triple disease and her age .     AVS with written instructions given    Follow-up in 6 months       40 minutes spent on the date of the encounter doing chart review, history and exam, documentation, and further activities as noted above.  reviewed available out side records in [facility] everywhere and updated chart     AVS with written instructions given.      Ben Doe MD, FAHA, FSVM, FNLA, FACP  Vascular Medicine  Clinical Lipidologist  Clinical Hypertension specialist

## 2023-03-20 ENCOUNTER — TELEPHONE (OUTPATIENT)
Dept: GASTROENTEROLOGY | Facility: CLINIC | Age: 73
End: 2023-03-20
Payer: COMMERCIAL

## 2023-05-15 NOTE — PROGRESS NOTES
[facility]    Outpatient Occupational Therapy Discharge Note  Patient: Luli Hobbs  : 1950    Beginning/End Dates of Reporting Period:  22 to 5/15/23    Referring Provider: Dr Doe    Therapy Diagnosis: lymphedema    Client Self Report:      Objective Measurements: see flowsheet                                                        Outcome Measures (most recent score): not scored       Goals:   Goal Identifier Ed   Goal Description Pt will report understandign differences between primary lymphedema/lipedema vs secondary lymphedema/phlebolymphedema vs cellulitis for safety and I with home management BLE lymphedema/lipedema   Target Date 22   Date Met      Progress (detail required for progress note):       Goal Identifier GCB Wearing   Goal Description Tolerate gradient compression bandaging/wearing compression garments 23 hrs/day to prevent re-acccumulation of extracellular fluid for max reducitons needed to reduce risk infections and promote improvements in walking/mobility tasks   Target Date 22   Date Met   (not met)   Progress (detail required for progress note):       Goal Identifier GCB Donning   Goal Description Demonstrate independence in applying gradient compression bandages for I building with home management of BLE lymphedema needed to aide improveemnts in mobility engagement   Target Date 22   Date Met  22   Progress (detail required for progress note):       Goal Identifier MLD/HEP   Goal Description Demonstrate independence in performing prescribed exercises to facilate the lymph system and muscle pumping systems for max reducitons needed to reduce risk infections and promote improvements in walking/mobility tasks   Target Date 22   Date Met   (issued forms for future use; goal not met)   Progress (detail required for progress note):       Goal  Identifier Garments   Goal Description Be independent in donning/doffing, wearing schedule, and care of compression garments for I building with home management of BLE lymphedema needed to aide improveemnts in mobility engagement   Target Date 11/25/22   Date Met   (no met)   Progress (detail required for progress note):       Goal Identifier Reductions   Goal Description Pt will display a total BLE volume reduciton of 1L+ for reducitons needed to reduce weight in legs for improved walking   Target Date 11/25/22   Date Met   (not met)   Progress (detail required for progress note):       Goal Identifier     Goal Description     Target Date     Date Met      Progress (detail required for progress note):       Goal Identifier     Goal Description     Target Date     Date Met      Progress (detail required for progress note):         Plan:  Discharge from therapy. Pt started therapy and was using GCBs but then decided to discontinue services. Pt was to cont GCB use and then get fit for velcro compression but has not returned for follow up so unclear I with home management. discharge POC    Discharge:    Reason for Discharge: Patient chooses to discontinue therapy.    Equipment Issued:     Discharge Plan: Patient to continue home program.

## 2023-05-15 NOTE — ADDENDUM NOTE
Encounter addended by: Liang Gutierrez on: 5/15/2023 11:44 AM   Actions taken: Clinical Note Signed, Episode resolved

## 2023-05-17 ENCOUNTER — VIRTUAL VISIT (OUTPATIENT)
Dept: GASTROENTEROLOGY | Facility: CLINIC | Age: 73
End: 2023-05-17
Payer: COMMERCIAL

## 2023-05-17 DIAGNOSIS — R13.19 ESOPHAGEAL DYSPHAGIA: Primary | ICD-10-CM

## 2023-05-17 DIAGNOSIS — K22.2 ESOPHAGEAL STRICTURE: ICD-10-CM

## 2023-05-17 PROCEDURE — 99204 OFFICE O/P NEW MOD 45 MIN: CPT | Mod: VID | Performed by: INTERNAL MEDICINE

## 2023-05-17 NOTE — NURSING NOTE
Is the patient currently in the state of [place]? YES    Visit mode:VIDEO    If the visit is dropped, the patient can be reconnected by: VIDEO VISIT: Send to e-mail at: [email or web address]    Will anyone else be joining the visit? NO      How would you like to obtain your AVS? MyChart    Are changes needed to the allergy or medication list? NO    Reason for visit: Video Visit

## 2023-05-17 NOTE — PROGRESS NOTES
"Virtual Visit Details    Type of service:  Video Visit   Video Start Time: 11  Video End Time:1125    Originating Location (pt. 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 Christianson with a chief complaint of dysphagia.  ---------------------------------------------------------------  Luli Hobbs states she has issues with dysphagia. This can occur after chewing on something. If she is standing and eating she can have her  Throat feel as if it is collapsed on itself. Patient reports that she received dilations over the years which appeared to help. She states she has been to the ER four different times for this.     Symptoms occur after swallowing and she begins coughing to try to cough it up. Symptoms can occur with a tiny bite of food. When standing and eating she is typically distracted and communicating with others. Solid foods are worse than others. If she eats red meat she cuts it into tiny pieces. Does not have issues with liquids.     The patient reports significant issues with nasal discharge and allergies.     Patient reports rare occurrences of heartburn. She has been on pantoprazole. If she has heartburn she will chew on a gaviscon which resolves her issue. Does not " notice a significant issue when she forgets to take her pantoprazole. Pantoprazole is 40mg twice a day before her breakfast and dinner when she remembers.     -----  EGD 2014  Findings:  -- A moderate Schatzki ring (acquired) was found in the lower   third of the esophagus.  -- The middle third of the esophagus and lower third of the   esophagus were moderately tortuous.  -- The exam of the esophagus was otherwise normal.  -- The entire examined stomach was normal.  -- The examined duodenum was normal.  Impressions/Post-Op Diagnosis:  - Moderate Schatzki ring.  - Tortuous esophagus.    EGD 7/29/2015 - stricture at 38 cm, dilated to 45 Kinyarwanda    See updated BEDQ and Eckardt score below: These patient reported outcomes are pertinent to the HPI.    ---------------------------------------------  Wt Readings from Last 5 Encounters:   12/29/22 96.2 kg (212 lb)   06/02/22 100.4 kg (221 lb 6.4 oz)   04/28/21 99.2 kg (218 lb 9.6 oz)   04/28/21 99.2 kg (218 lb 9.6 oz)   04/22/21 100.2 kg (221 lb)        Esophageal Questionnaire(s)    BEDQ Questionnaire      5/16/2023     7:31 PM   BEDQ Questionnaire: How Often Have You Had the Following?   Trouble eating solid food (meat, bread, vegetables) 1   Trouble eating soft foods (yogurt, jello, pudding) 0   Trouble swallowing liquids 0   Pain while swallowing 0   Coughing or choking while swallowing foods or liquids 1   Total Score: 2         5/16/2023     7:31 PM   BEDQ Questionnaire: Discomfort/Pain Ratings   Eating solid food (meat, bread, vegetables) 1   Eating soft foods (yogurt, jello, pudding) 0   Drinking liquid 1   Total Score: 2       Eckardt Questionnaire      5/16/2023     7:34 PM   Eckardt Questionnaire   Dysphagia 1   Regurgitation 1   Retrosternal Pain 1   Weight Loss (kg) 0   Total Score:  3       Promis 10 Questionnaire      5/16/2023     7:36 PM   PROMIS 10 FLOWSHEET DATA   In general, would you say your health is: 3   In general, would you say your quality of life  is: 4   In general, how would you rate your physical health? 3   In general, how would you rate your mental health, including your mood and your ability to think? 4   In general, how would you rate your satisfaction with your social activities and relationships? 4   In general, please rate how well you carry out your usual social activities and roles. (This includes activities at home, at work and in your community, and responsibilities as a parent, child, spouse, employee, friend, etc.) 2   To what extent are you able to carry out your everyday physical activities such as walking, climbing stairs, carrying groceries, or moving a chair? 3   In the past 7 days, how often have you been bothered by emotional problems such as feeling anxious, depressed, or irritable? 1   In the past 7 days, how would you rate your fatigue on average? 4   In the past 7 days, how would you rate your pain on average, where 0 means no pain, and 10 means worst imaginable pain? 5   Mental health question re-calculation - no clinical value 5   Physical health question re-calculation - no clinical value 2   Pain question re-calculation - no clinical value 3   Global Mental Health Score 17   Global Physical Health Score 11   PROMIS TOTAL - SUBSCORES 28           STUDIES & PROCEDURES:    EGD:   Date: 3/11/19  Impression: GEJ stricture with ulceration from prior food impaction  Pathology Report: distal and proximal bx normal    Colonoscopy:  Date:  Impression:  Pathology Report:     EndoFLIP directed at the UES or LES (8cm (EF-325) balloon length or 16cm (EF-322) balloon length):   Date:  8cm balloon  Balloon inflation Balloon pressure CSA (mm^2) DI (mm^2/mmHg) Dmin (mm) Compliance   20 (ladmark ID)        30        40        50           16cm balloon  Balloon inflation Balloon pressure CSA (mm^2) DI (mm^2/mmHg) Dmin (mm) Compliance   30 (ladmark ID)        40        50        60        70           High Resolution  Manometry:  Date:  Impression:    PH/Impedance:  Date:  Impression:     Bravo:  48 or 96hr  Date:  Impression:    CT abdomen and pelvis  Date:7/31/22  Impression:  1. No bowel obstruction. Moderate stool burden. Colonic   diverticulosis. Questionable subtle fat stranding along some areas of   the sigmoid colon, difficult to exclude early or resolving   diverticulitis.   3. Mild circumferential wall thickening of the distal esophagus,   correlate for esophagitis.   3. Small amount of fluid in the endometrial cavity, atypical in this   presumed postmenopausal female. Some splaying of the uterine horns,   difficult to exclude mullerian duct anomaly. 4.4 cm RIGHT ovarian   cyst. Recommend follow-up with a pelvic ultrasound on a nonemergent   outpatient basis.   4. Mild patchy opacities in the lung bases, greater on the LEFT could   reflect atelectasis, difficult to exclude a subtle pneumonitis.   5. Additional findings as detailed above.       Esophagram:  Date:  Impression:     Prior medical records were reviewed including, but not limited to, notes from referring providers, lab work, radiographic tests, and other diagnostic tests. Pertinent results were summarized above.     History     Past Medical History:   Diagnosis Date     Allergic rhinitis      Allergic rhinitis, cause unspecified      Arthritis      Breast cancer (H) 01/01/2000     Esophageal stricture      Hypertension      Lipodermatosclerosis of left lower extremity      Lymphedema      Malignant neoplasm of breast (female), unspecified site 01/01/2000    had left lumpectomy and radiation     Morbid obesity (H)      Multiple environmental allergies     Autoimmune disorder causing hypersensitivity     Obesity      Peptic ulcer      Spondylolysis     lumbar     Varicose vein of leg        Past Surgical History:   Procedure Laterality Date     ABLATION SAPHENOUS VEIN W/ RFA Left 2019     [facility] AFF SURGICAL PATHOLOGY  1997    lipoma from abdomen removed      ESOPHAGOSCOPY, GASTROSCOPY, DUODENOSCOPY (EGD), COMBINED  2019     FOOT ARTHRODESIS, SUBTALAR       HC EXCISION BREAST LESION, OPEN >=1       [facility] RECONSTR NOSE+SIVAKUMAR SEPTAL REPAIR      cleaned out sinus passages     INSERT INTRACORONARY STENT Right 2018    Right Thigh     LUMPECTOMY BREAST       RELEASE CARPAL TUNNEL Bilateral      SEPTOPLASTY       TONSILLECTOMY       TONSILLECTOMY       [facility] FOOT/TOES SURGERY PROC UNLISTED      bone spur removed, toe straightened     [facility] TOTAL HIP ARTHROPLASTY Right 2021    Procedure: RIGHT TOTAL HIP ARTHROPLASTY DIRECT ANTERIOR APPROACH;  Surgeon: Gomez Barnett MD;  Location: [facility];  Service: Orthopedics     [facility] NCS MOTOR W/O F-WAVE, EACH NERVE  2003    bilateral       Social History     Socioeconomic History     Marital status: Single     Spouse name: Not on file     Number of children: 0     Years of education: Not on file     Highest education level: Not on file   Occupational History     Occupation: artist      Employer: [employer]   Tobacco Use     Smoking status: Former     Packs/day: 1.50     Years: 2.00     Pack years: 3.00     Types: Cigarettes     Quit date: 1972     Years since quittin.4     Smokeless tobacco: Never   Vaping Use     Vaping status: Not on file   Substance and Sexual Activity     Alcohol use: Yes     Alcohol/week: 14.0 standard drinks of alcohol     Types: 14 Standard drinks or equivalent per week     Comment: 1-2 glassesof wine daily.     Drug use: Never     Sexual activity: Not Currently   Other Topics Concern     Not on file   Social History Narrative     Not on file     Social Determinants of Health     Financial Resource Strain: Not on file   Food Insecurity: Not on file   Transportation Needs: Not on file   Physical Activity: Not on file   Stress: Not on file   Social Connections: Not on file   Intimate Partner Violence: Not on file   Housing Stability: Not on file       Family History    Problem Relation Age of Onset     Musculoskeletal Disorder Father         dementia     Cancer Maternal Grandfather         skin cancer     Family history reviewed and edited as appropriate    Medications and Allergies:     Outpatient Encounter Medications as of 5/17/2023   Medication Sig Dispense Refill     acetaminophen (TYLENOL) 325 MG tablet Take 325-650 mg by mouth 1 capsule daily.       CALCIUM 500 + D 500-200 MG-IU OR TABS one daily  0     cholecalciferol 25 MCG (1000 UT) TABS Take 2,000 Units by mouth       Ginkgo Biloba 120 MG TABS Take 2 tablets by mouth daily       HEMP OIL OR EXTRACT OR OTHER CBD CANNABINOID, NOT MEDICAL CANNABIS, Take 25 mg by mouth As needed       metoprolol succinate ER (TOPROL XL) 200 MG 24 hr tablet Take 200 mg by mouth       MULTIVITAMIN/MULTIMINERAL TABS   OR 1 TABLET DAILY 30 0     OMEGA 3 550 MG OR CAPS takes one daily  0     pantoprazole (PROTONIX) 40 MG EC tablet Take 40 mg by mouth As needed       rosuvastatin (CRESTOR) 5 MG tablet        ST FULLER WORT 350 MG OR CAPS unsure of mg strength, takes one daily 90 0     traMADol (ULTRAM) 50 MG tablet Take 25-50 mg by mouth PRN       triamterene-HCTZ (MAXZIDE-25) 37.5-25 MG tablet Take 1 tablet by mouth every morning       Turmeric Curcumin 500 MG CAPS Take 1 capsule by mouth       VITAMIN E 200 UNIT OR CAPS takes one daily  0     ZYRTEC D ONE BID 28 0     No facility-administered encounter medications on file as of 5/17/2023.        Allergies   Allergen Reactions     Codeine Nausea and Vomiting        Review of systems:  A full 10 point review of systems was obtained and was negative except for the pertinent positives and negatives stated within the HPI.    Objective Findings:   Physical Exam:    Constitutional: There were no vitals taken for this visit.  General: Alert, cooperative, no distress, well-appearing  Head: Atraumatic, normocephalic, no obvious abnormalities   Eyes: EOMI, Sclera anicteric, no obvious conjunctival  "hemorrhage   Nose: Nares normal, no obvious malformation, no obvious rhinorrhea   Respiratory: normal appearing respirations, no cough  Musculoskeletal: Range of motion intact, no obvious strength deficit  Skin: No jaundice, no obvious rash  Neurologic: AAOx3, no obvious neurologic abnormality  Psychiatric: Normal Affect, appropriate mood  Extremities: No obvious edema, no obvious malformation     Labs, Radiology, Pathology     Lab Results   Component Value Date    HGB 11.7 (L) 04/28/2021    CHOL 258 (H) 04/18/2006    TRIG 115 04/18/2006    HDL 67 04/18/2006     04/18/2006    BUN 12 04/18/2006    CO2 24 04/18/2006    TSH 2.73 04/18/2006        Liver Function Studies - No results for input(s): PROTTOTAL, ALBUMIN, BILITOTAL, ALKPHOS, AST, ALT, BILIDIRECT in the last 51676 hours.       Patient Active Problem List    Diagnosis Date Noted     Esophageal dysphagia 05/17/2023     Priority: Medium     Esophageal stricture 05/17/2023     Priority: Medium     Malignant neoplasm of female breast (H)      Priority: Medium     had left lumpectomy and radiation  Problem list name updated by automated process. Provider to review       Allergic rhinitis      Priority: Medium     Problem list name updated by automated process. 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 Christianson with a chief complaint of dysphagia.    The patient was seen in video telemedicine consultation regarding her symptoms of dysphagia and history of Schatzki's ring and/or peptic stricture.  She is currently on pantoprazole 40 mg twice daily.  She was counseled on the timing of taking the medication to improve its efficacy.    She has required dilation in the past.  Her last " endoscopy with dilation was in 2019 and she had an episode where she felt as if she had a food impaction in 2022.  She was in a remote area of [place] and did not have access to an endoscopy at that time.  The food impaction cleared with consumption of Coca[employer].    I have asked that she undergo a barium esophagram with a barium tablet.  This will help plan her endoscopy with dilation.    I have also ordered an upper endoscopy.  This will be scheduled with me preferentially however if schedules do not align I have provided additional names for providers who can perform an endoscopy with dilation for her.    1. Esophageal dysphagia    2. Esophageal stricture       Orders Placed This Encounter   Procedures     XR Esophagram     Adult GI  Referral - Procedure Only      Plan:    1. Please schedule a barium esophagram to evaluate your esophagus and to help with planning for your endoscopy with dilation  2. Please schedule an upper endoscopy with plan to perform dilation.   3. Continue taking the pantoprazole 40mg twice daily 30-60 minutes before breakfast (first meal of the day) and dinner    Follow up plan:   Return to clinic 6 months and as needed.    The risks and benefits of my recommendations, as well as other treatment options were discussed with the patient and any available family today. All questions were answered.     o Follow up: As planned above. Today, I personally spent 25 minutes in direct face to face time with the patient, of which greater than 50% of the time was spent in patient education and counseling as described above. Approximately 19 minutes were spent on indirect care associated with the patient's consultation including but not limited to review of: patient medical records to date, clinic visits, hospital records, lab results, imaging studies, procedural documentation, and coordinating care with other providers. The findings from this review are summarized in the above note. All of  the above accounted for a cumulative time of 44 minutes and was performed on the date of service.     The patient verbalized understanding of the plan and was appreciative for the time spent and information provided during the office visit.     Author:   Bk Dasilva DO   of Medicine  Director, Esophageal Disorders Program  Division of Gastroenterology, Hepatology, and Nutrition  [facility]    Dr. Dasilva speaks for [employer] regarding dupilumab and has participated in advisory boards for the medication. When discussing the medication, patients were informed of Dr. Dasilva's role and potential conflict of interest.     Documentation assisted by voice recognition and documentation system.

## 2023-05-17 NOTE — PATIENT INSTRUCTIONS
It was a pleasure taking care of you today.  I've included a brief summary of our discussion and care plan from today's visit below.  Please review this information with your primary care provider.  _______________________________________________________________________    My recommendations are summarized as follows:    Please schedule a barium esophagram to evaluate your esophagus and to help with planning for your endoscopy with dilation  Please schedule an upper endoscopy with plan to perform dilation.   Continue taking the pantoprazole 40mg twice daily 30-60 minutes before breakfast (first meal of the day) and dinner    To schedule endoscopic procedures you may call: [phone]  To schedule radiology tests you may call: [phone]  To schedule an ENT appointment you may call: [phone]    Please call my nurse care coordinator Stew ([phone]) or Ching ([phone]), with any questions or concerns.  If you were seen through the [facility] please feel free to reach out to Jaelyn at [phone]   --    Return to GI Clinic in 6 months to review your progress.    _______________________________________________________________________    Who do I call with any questions after my visit?  Please be in touch if there are any further questions that arise following today's visit.  There are multiple ways to contact your gastroenterology care team.      During business hours, you may reach a Gastroenterology nurse at [phone] and choose option 3.       To schedule or reschedule an appointment, please call [phone].     You can always send a secure message through leemail.  leemail messages are answered by your nurse or doctor typically within 24 hours.  Please allow extra time on weekends and holidays.      For urgent/emergent questions after business hours, you may reach the on-call GI Fellow by contacting the [facility]  at [phone].     How will I get the results of any  tests ordered?    You will receive all of your results.  If you have signed up for [employer], any tests ordered at your visit will be available to you after your physician reviews them.  Typically this takes 1-2 weeks.  If there are urgent results that require a change in your care plan, your physician or nurse will call you to discuss the next steps.      What is [employer]?  [employer] is a secure way for you to access all of your healthcare records from the [facility].  It is a web based computer program, so you can sign on to it from any location.  It also allows you to send secure messages to your care team.  I recommend signing up for [employer] access if you have not already done so and are comfortable with using a computer.      How to I schedule a follow-up visit?  If you did not schedule a follow-up visit today, please call [phone] to schedule a follow-up office visit.      If you feel you received exceptional care and are interested in supporting the clinical and research goals of Dr. Dasilva or the Division of Gastroenterology, Hepatology, and Nutrition please contact [email or web address] from the [facility] to discuss opportunities to donate.    Sincerely,    Bk Dasilva DO   of Medicine  Director, Esophageal Disorders Program  Division of Gastroenterology, Hepatology, and Nutrition  [facility]

## 2023-06-16 ENCOUNTER — TELEPHONE (OUTPATIENT)
Dept: GASTROENTEROLOGY | Facility: CLINIC | Age: 73
End: 2023-06-16
Payer: COMMERCIAL

## 2023-06-16 NOTE — TELEPHONE ENCOUNTER
"Endoscopy Scheduling Screen    Have you had a positive Covid test in the last 14 days?  No    Are you active on MyChart?   No    What insurance is in the chart?  Other:  [facility]    Ordering/Referring Provider: Bk Dasilva DO   (If ordering provider performs procedure, schedule with ordering provider unless otherwise instructed. )    BMI: Estimated body mass index is 37.55 kg/m  as calculated from the following:    Height as of 12/29/22: 1.6 m (5' 3\").    Weight as of 12/29/22: 96.2 kg (212 lb).     Sedation Ordered  MAC/deep sedation.   BMI<= 45 45 < BMI <= 48 48 < BMI < = 50  BMI > 50   No Restrictions No [facility] ASC  No [facility]  [facility] with exceptions Hospital Only OR Only       Do you have a history of malignant hyperthermia or adverse reaction to anesthesia?  No    (Females) Are you currently pregnant?   No     Have you been diagnosed or told you have pulmonary hypertension?   No    Do you have an LVAD?  No    Have you been told you have moderate to severe sleep apnea?  No    Have you been told you have COPD, asthma, or any other lung disease?  No    Do you have any heart conditions?  No     Have you ever had or are you awaiting a heart or lung transplant?   No    Have you had a stroke or transient ischemic attack (TIA aka \"mini stroke\" in the last 6 months?   No    Have you been diagnosed with or been told you have cirrhosis of the liver?   No    Are you currently on dialysis?   No    Do you need assistance transferring?   No    BMI: Estimated body mass index is 37.55 kg/m  as calculated from the following:    Height as of 12/29/22: 1.6 m (5' 3\").    Weight as of 12/29/22: 96.2 kg (212 lb).     Is patients BMI > 40 and scheduling location [facility]?  No    Do you take the medication Phentermine, Ozempic or Wegovy?  No    Do you take the medication Naltrexone?  No    Do you take blood thinners?  No    Preferred Pharmacy:    [id number] [address] AT [address] [place] [address] " [address]  [address] [place] [id number]  Phone: [phone] Fax: [phone]      Final Scheduling Details   Procedure scheduled  EGD    Surgeon:  DAGMAR     Date of procedure:   08/21/2023    Schedule PAC:   No    Location  [facility]    Sedation   Moderate Sedation    Patient Reminders:    You will receive a call from a Nurse to review instructions and health history.  This assessment must be completed prior to your procedure.  Failure to complete the Nurse assessment may result in the procedure being cancelled.       On the day of your procedure, please designate an adult(s) who can drive you home stay with you for the next 24 hours. The medicines used in the exam will make you sleepy. You will not be able to drive.       You cannot take public transportation, ride share services, or non-medical taxi service without a responsible caregiver.  Medical transport services are allowed with the requirement that a responsible caregiver will receive you at your destination.  We require that drivers and caregivers are confirmed prior to your procedure.

## 2023-08-03 ENCOUNTER — TELEPHONE (OUTPATIENT)
Dept: GASTROENTEROLOGY | Facility: CLINIC | Age: 73
End: 2023-08-03
Payer: COMMERCIAL

## 2023-08-03 NOTE — TELEPHONE ENCOUNTER
Pre assessment completed for upcoming procedure.      Procedure details:    Patient scheduled for Upper endoscopy (EGD) on 8/21/23.     Arrival time: 0945. Procedure time 1045    Pre op exam needed? N/A    Facility location: [facility]; [address] [place] [address], 5th Floor, [place] [address]    Sedation type: MAC    Indication for procedure: dysphagia, history of schatzki's ring/peptic stricture, egd with dilation     COVID policy reviewed.    Designated  policy reviewed. Instructed to have someone stay 24 hours post procedure.       Chart review:     Electronic implanted devices? No    Diabetic? No    Diabetic medication HOLDING recommendations: (if applicable)  Oral diabetic medications: No  Diabetic injectables: No  Insulin: No    Medication review:    Anticoagulants? No    NSAIDS? No    Other medication HOLDING recommendations:  N/A      Prep for procedure:     Prep instructions sent via letter     Reviewed procedure prep instructions.     Patient verbalized understanding and had no questions or concerns at this time.        Love Dee RN  Endoscopy Procedure Pre Assessment RN  [phone] option 4

## 2023-08-03 NOTE — LETTER
August 3, 2023      Luli Hobbs  [address] [id number]              Dear Luli,        Upper endoscopy (EGD)     Procedure date: 8/21/23    Anticipated arrival time: 0945 AM   (Procedure Times are Subject to Change)    [facility]; [address] [place] [address], 5th Floor, [place] [address] - Check in location: 5th Floor. Parking information: Self pay parking is available in West surface lot directly across from the  main entrance of the [facility]. Entry and exit to this lot is on [facility]. In  addition, self pay parking is also available in [address] ([address] [facility]).  We have dedicated patient only spots on 1st floor of [address] ramp.  services are available for patients with limited mobility. Services available Monday-Friday, 7:00a.m.-  5:00p.m.      Important Procedure Reminders:     Prep Instructions:   Instructions on how to prepare for your upcoming procedure are found below. Please read instructions carefully. Deviation from instructions may result in less than desired outcomes and procedure may need to be rescheduled. If you have additional questions regarding how to prepare for your upcoming procedure, please contact our endoscopy pre assessment nurses at [phone] option 4.   You will receive a call from a Nurse to review instructions and health history.  This assessment must be completed prior to your procedure.  Failure to complete the Nurse assessment may result in the procedure being cancelled.     Policy:   On the day of your procedure, please designatean adult(s) who can drive you home stay with you for the next 24 hours. The medicines used in the exam will make you sleepy. You will not be able to drive. You cannot take public transportation, ride share services, or non-medical taxi service without a responsible caregiver.  Medical transport services are allowed with the requirement that a responsible caregiver will receive  you at your destination.  We require that drivers and caregivers are confirmed prior to your procedure.    If you are taking blood thinning medication:   Medications such as Coumadin, Plavix, Rivaroxaban (Xarelto)..etc, may need to be temporarily stopped for multiple days before your scheduled procedure. Talk to your doctor. Your prescribing doctor will give you directions to see if these medications should be changed or stopped prior to your exam. Please note that procedure may be canceled if medications are not accurately held.     If you have Diabetes:   Ask to have your exam early in the morning if possible.    Please note the following diabetic medication management instructions:  Injectable medications such as Ozempic (semaglutide), Wegovy, Mounjaro...etc, need to be held for one week prior to your scheduled procedure. Failure to hold may result in your procedure needing to be rescheduled or cancelled.   Insulin:  Please consult with your provider if you have questions regarding medication management during prepping period.   Oral diabetic medications: typical holding period of these medications are the day of procedure. If needing longer holding interval, this will be discussed during pre assessment nurse call.     If you take Phentermine (Adipex-P, Lomaira) medication:   This MUST be held 7 days prior to your scheduled procedure. Your procedure will be canceled if this medication has not been held.     Day of procedure:  Please do not wear jewelry (i.e. earrings, rings, necklaces, watches, etc) . Leave your purse, billfold, credit cards, and other valuables at home.   Bring insurance card and ID.       To reschedule or cancel your procedure:   Please call our endoscopy scheduling team at: [facility]: [phone], option 2. Monday through Friday,  7:00am-5:00pm.    --------------------------------------------------------------------------------------------------------------------------------------------------------------------------------------------------------------------------------------------    Instructions for Your Upper Endoscopy      You will receive a call from a Nurse to review instructions and health history.  This assessment must be completed prior to your procedure.  Failure to complete the Nurse assessment may result in the procedure being cancelled.    On the day of your procedure, please designatean adult(s) who can drive you home stay with you for the next 24 hours. The medicines used in the exam will make you sleepy. You will not be able to drive.    You cannot take public transportation, ride share services, or non-medical taxi service without a responsible caregiver.  Medical transport services are allowed with the requirement that a responsible caregiver will receive you at your destination.  We require that drivers and caregivers are confirmed prior to your procedure.        What is an upper endoscopy?  - This is an exam that checks for problems linked to heartburn, swallowing or belly (abdominal) pain or other symptoms of the upper GI tract. Depending on your symptoms, the doctor will look at your esophagus (food pipe), stomach and the part of the stomach that enters the small intestine.     Getting ready  - Dress in comfortable, loose clothing.  - Bring your insurance card. Leave your purse, billfold, credit cards and other valuables at home.  - Bring a list of your medicines and known allergies. If you have a pacemaker or ICD, please bring your information card.  - We do our best to stay on time, but there may be a delay. Please bring something to pass the time, such as a newspaper or book.    - Important: You must complete all steps before the exam.     Seven days before the exam   - Talk to your doctor: If you take blood-thinners  (such as Coumadin, Plavix, Xarelto), your prescription or schedule may need to change before the test.  - Talk to your prescribing provider: If you take prescription NSAIDS (such as Sulindac, Celebrex, Mobic, Relafen). Your prescribing provider will tell you what to do.   - Continue taking prescribed aspirin; talk to your prescribing doctor with any concerns.    - If you have diabetes: Ask to have your exam early in the morning. Also, ask your doctor if you should change your diet or medicines    One day before the exam   - Stop eating all solid foods 8 hours prior to arrival time. You may drink clear liquids.   **If you have achalasia (treated or untreated) or gastroparesis, please be on a clear liquid diet for 24 hours prior to your exam and stop drinking all liquids at midnight.   - Stop taking sucralfate medication.  - Stop taking NSAID pain relievers, such as Advil, Ibuprofen, Motrin, etc.  You may take Tylenol.    Day of the exam  - You may drink clear liquids until 2 hours before your arrival time.  - You may take all of your morning medicines (except for diabetes pills) as usual with 4 oz. of water up to 2 hours before your arrival time.  - If you take diabetes medicine (pills): do not take them the morning of your test.  - Bring a list of your medicines and known allergies.  - Please do not wear jewelry (i.e. earrings, rings, necklaces, watches, etc) . Leave your purse, billfold, credit cards, and other valuables at home.   - Please arrive with an adult who can take you home after the test: The medicine will make you sleepy. If you do not have a , we may cancel your test.     What are clear liquids?   You may have:  - Water, tea, coffee (no cream)  - Soda pop, Gatorade   - Clear nutrition drinks (Enlive, Resource Breeze)   - Jell-O, Popsicles (no milk or fruit pieces) or sorbet   - Fat-free soup broth or bouillon  - Plain hard cand, such as clear life savers   - Clear juices and fruit-flavored drinks  such as apple juice, white grape juice, Hi-C and Norman-Aid    Do not have:  - Milk or milk products such as ice cream, malts or shakes  - Juices with pulp such as orange, grapefruit, pineapple or tomato juice  - Cream soups of any kind  - Alcohol       During the exam  - The exam lasts from 10 to 20 minutes.   - We will review the risks and benefits of the exam. We will then ask you to sign a consent form.  - We will place a small needle (IV) in your hand or arm. We will give you medicine through the IV to keep you comfortable.   - The endoscope will be advanced through your mouth and the exam completed.  - When we put air into your stomach, you may feel full or have mild cramps. We will remove the air at the end of the exam.  - To reduce discomfort, breathe at a slow, even pace. Try to relax the muscles in your neck and shoulders.  - We may take a small piece of tissue (a biopsy) to test in the lab. It will not hurt. We may also take pictures of your insides.     After the exam  - You will rest in the recovery area until you feel ready to leave. This takes about 30 to 60 minutes.   - We will remove the IV.  - You may burp up air left in your stomach.  - You may feel drowsy or a little dizzy from the medicine.   - Your doctor will discuss your results. You will receive your test results in 7 to 10 business days by letter or MyChart.   - Your throat may feel numb. One hour after the exam, swallow a small amount of cool water. If you can swallow easily, you may go back to your regular diet and medicines.  - Your throat may be sore for the rest of the day. Throat lozenges or ice chips may help.  - Do not drive for 24 hours.    Call us at once if you have:  - Unusual pain or problems swallowing, unusual stomach or chest pain.  - Vomit that looks like coffee grounds or black or bloody stools (bowel movements).  - A fever above 100.6  F (37.5  C) when taken under the tongue.    Test results  - You will receive your results  in 7 to 10 business days by phone, letter or MyChart.    For questions or appointments, call:  [facility]   [phone], option 2  Monday through Friday, 7 a.m. to 5 p.m.  (If it is after hours please reach out to the clinic or provider you were scheduled with.)    You should be aware that your endoscopist may be a part of a study to improve endoscopy procedures.  As part of a study, pictures gathered from your procedure may be stored and analyzed in a de-identified manner.

## 2023-08-18 ENCOUNTER — ANESTHESIA EVENT (OUTPATIENT)
Dept: SURGERY | Facility: AMBULATORY SURGERY CENTER | Age: 73
End: 2023-08-18
Payer: COMMERCIAL

## 2023-08-20 ASSESSMENT — LIFESTYLE VARIABLES: TOBACCO_USE: 1

## 2023-08-20 NOTE — ANESTHESIA PREPROCEDURE EVALUATION
Anesthesia Pre-Procedure Evaluation    Patient: Luli Hobbs   MRN: [id number] : 1950        Procedure : Procedure(s):  Esophagoscopy, gastroscopy, duodenoscopy (EGD), combined          Past Medical History:   Diagnosis Date    Allergic rhinitis     Allergic rhinitis, cause unspecified     Arthritis     Breast cancer (H) 2000    Esophageal stricture     Hypertension     Lipodermatosclerosis of left lower extremity     Lymphedema     Malignant neoplasm of breast (female), unspecified site 2000    had left lumpectomy and radiation    Morbid obesity (H)     Multiple environmental allergies     Autoimmune disorder causing hypersensitivity    Obesity     Peptic ulcer     Spondylolysis     lumbar    Varicose vein of leg       Past Surgical History:   Procedure Laterality Date    ABLATION SAPHENOUS VEIN W/ RFA Left     CL AFF SURGICAL PATHOLOGY      lipoma from abdomen removed    ESOPHAGOSCOPY, GASTROSCOPY, DUODENOSCOPY (EGD), COMBINED      FOOT ARTHRODESIS, SUBTALAR      HC EXCISION BREAST LESION, OPEN >=1      HC RECONSTR NOSE+SIVAKUMAR SEPTAL REPAIR      cleaned out sinus passages    INSERT INTRACORONARY STENT Right 2018    Right Thigh    LUMPECTOMY BREAST      RELEASE CARPAL TUNNEL Bilateral     SEPTOPLASTY      TONSILLECTOMY      TONSILLECTOMY      [facility] FOOT/TOES SURGERY PROC UNLISTED      bone spur removed, toe straightened    [facility] TOTAL HIP ARTHROPLASTY Right 2021    Procedure: RIGHT TOTAL HIP ARTHROPLASTY DIRECT ANTERIOR APPROACH;  Surgeon: Gomez Barnett MD;  Location: [facility];  Service: Orthopedics    [facility] NCS MOTOR W/O F-WAVE, EACH NERVE      bilateral      Allergies   Allergen Reactions    Codeine Nausea and Vomiting      Social History     Tobacco Use    Smoking status: Former     Packs/day: 1.50     Years: 2.00     Pack years: 3.00     Types: Cigarettes     Quit date: 1972     Years since quittin.6    Smokeless tobacco: Never   Substance  Use Topics    Alcohol use: Yes     Alcohol/week: 14.0 standard drinks of alcohol     Types: 14 Standard drinks or equivalent per week     Comment: 1-2 glassesof wine daily.      Wt Readings from Last 1 Encounters:   12/29/22 96.2 kg (212 lb)        Anesthesia Evaluation   Pt has had prior anesthetic. Type: General and MAC.        ROS/MED HX  ENT/Pulmonary:     (+)     TYLER risk factors,  hypertension, obese,   allergic rhinitis,     tobacco use, Past use,                      Neurologic:  - neg neurologic ROS     Cardiovascular:     (+) Dyslipidemia hypertension- -   -  - -                                      METS/Exercise Tolerance:     Hematologic:       Musculoskeletal:       GI/Hepatic:     (+) GERD, Asymptomatic on medication, esophageal disease, Stricture,                Renal/Genitourinary:  - neg Renal ROS     Endo:     (+)               Obesity,       Psychiatric/Substance Use: Comment: Heavy alcohol use. 2 glasses of wine a day      Infectious Disease:       Malignancy:   (+) Malignancy, History of Breast.Breast CA Remission status post Surgery.      Other:  - neg other ROS          Physical Exam    Airway        Mallampati: III       Respiratory Devices and Support         Dental           Cardiovascular             Pulmonary                   OUTSIDE LABS:  CBC:   Lab Results   Component Value Date    HGB 11.7 (L) 04/28/2021     BMP:   Lab Results   Component Value Date     04/18/2006    POTASSIUM 3.6 04/18/2006    CHLORIDE 102 04/18/2006    CO2 24 04/18/2006    BUN 12 04/18/2006    CR 0.80 04/18/2006     04/18/2006     COAGS: No results found for: PTT, INR, FIBR  POC: No results found for: BGM, HCG, HCGS  HEPATIC: No results found for: ALBUMIN, PROTTOTAL, ALT, AST, GGT, ALKPHOS, BILITOTAL, BILIDIRECT, MARGARET  OTHER:   Lab Results   Component Value Date    KEISHA 9.6 04/18/2006    TSH 2.73 04/18/2006    T4 1.07 04/18/2006       Anesthesia Plan    ASA Status:  3       Anesthesia Type: MAC.     -  Reason for MAC: straight local not clinically adequate              Consents            Postoperative Care    Pain management: IV analgesics, Oral pain medications.   PONV prophylaxis: Ondansetron (or other 5HT-3)     Comments:           H&P reviewed: Unable to attach H&P to encounter due to EHR limitations. H&P Update: appropriate H&P reviewed, patient examined. No interval changes since H&P (within 30 days).         Rica Camacho MD

## 2023-08-21 ENCOUNTER — HOSPITAL ENCOUNTER (OUTPATIENT)
Facility: AMBULATORY SURGERY CENTER | Age: 73
Discharge: HOME | End: 2023-08-21
Attending: INTERNAL MEDICINE
Payer: COMMERCIAL

## 2023-08-21 ENCOUNTER — ANESTHESIA (OUTPATIENT)
Dept: SURGERY | Facility: AMBULATORY SURGERY CENTER | Age: 73
End: 2023-08-21
Payer: COMMERCIAL

## 2023-08-21 VITALS
BODY MASS INDEX: 38.98 KG/M2 | HEART RATE: 69 BPM | DIASTOLIC BLOOD PRESSURE: 71 MMHG | OXYGEN SATURATION: 95 % | WEIGHT: 220 LBS | RESPIRATION RATE: 18 BRPM | TEMPERATURE: 97 F | HEIGHT: 63 IN | SYSTOLIC BLOOD PRESSURE: 116 MMHG

## 2023-08-21 VITALS — HEART RATE: 69 BPM

## 2023-08-21 LAB — UPPER GI ENDOSCOPY: NORMAL

## 2023-08-21 PROCEDURE — 88305 TISSUE EXAM BY PATHOLOGIST: CPT | Mod: 26 | Performed by: PATHOLOGY

## 2023-08-21 PROCEDURE — 43239 EGD BIOPSY SINGLE/MULTIPLE: CPT | Mod: 59 | Performed by: INTERNAL MEDICINE

## 2023-08-21 PROCEDURE — 88305 TISSUE EXAM BY PATHOLOGIST: CPT | Mod: TC | Performed by: INTERNAL MEDICINE

## 2023-08-21 PROCEDURE — 43249 ESOPH EGD DILATION <30 MM: CPT | Performed by: INTERNAL MEDICINE

## 2023-08-21 RX ORDER — GLYCOPYRROLATE 0.2 MG/ML
INJECTION, SOLUTION INTRAMUSCULAR; INTRAVENOUS PRN
Status: DISCONTINUED | OUTPATIENT
Start: 2023-08-21 | End: 2023-08-21

## 2023-08-21 RX ORDER — PROPOFOL 10 MG/ML
INJECTION, EMULSION INTRAVENOUS PRN
Status: DISCONTINUED | OUTPATIENT
Start: 2023-08-21 | End: 2023-08-21

## 2023-08-21 RX ORDER — OXYCODONE HYDROCHLORIDE 5 MG/1
10 TABLET ORAL
Status: DISCONTINUED | OUTPATIENT
Start: 2023-08-21 | End: 2023-08-22 | Stop reason: HOSPADM

## 2023-08-21 RX ORDER — LIDOCAINE 40 MG/G
CREAM TOPICAL
Status: DISCONTINUED | OUTPATIENT
Start: 2023-08-21 | End: 2023-08-21 | Stop reason: HOSPADM

## 2023-08-21 RX ORDER — HYDROMORPHONE HYDROCHLORIDE 1 MG/ML
0.4 INJECTION, SOLUTION INTRAMUSCULAR; INTRAVENOUS; SUBCUTANEOUS EVERY 5 MIN PRN
Status: DISCONTINUED | OUTPATIENT
Start: 2023-08-21 | End: 2023-08-21 | Stop reason: HOSPADM

## 2023-08-21 RX ORDER — FENTANYL CITRATE 50 UG/ML
25 INJECTION, SOLUTION INTRAMUSCULAR; INTRAVENOUS EVERY 5 MIN PRN
Status: DISCONTINUED | OUTPATIENT
Start: 2023-08-21 | End: 2023-08-21 | Stop reason: HOSPADM

## 2023-08-21 RX ORDER — ONDANSETRON 2 MG/ML
4 INJECTION INTRAMUSCULAR; INTRAVENOUS EVERY 30 MIN PRN
Status: DISCONTINUED | OUTPATIENT
Start: 2023-08-21 | End: 2023-08-21 | Stop reason: HOSPADM

## 2023-08-21 RX ORDER — OXYCODONE HYDROCHLORIDE 5 MG/1
5 TABLET ORAL
Status: DISCONTINUED | OUTPATIENT
Start: 2023-08-21 | End: 2023-08-22 | Stop reason: HOSPADM

## 2023-08-21 RX ORDER — PROCHLORPERAZINE MALEATE 5 MG
5 TABLET ORAL EVERY 6 HOURS PRN
Status: DISCONTINUED | OUTPATIENT
Start: 2023-08-21 | End: 2023-08-22 | Stop reason: HOSPADM

## 2023-08-21 RX ORDER — NALOXONE HYDROCHLORIDE 0.4 MG/ML
0.4 INJECTION, SOLUTION INTRAMUSCULAR; INTRAVENOUS; SUBCUTANEOUS
Status: DISCONTINUED | OUTPATIENT
Start: 2023-08-21 | End: 2023-08-22 | Stop reason: HOSPADM

## 2023-08-21 RX ORDER — SODIUM CHLORIDE, SODIUM LACTATE, POTASSIUM CHLORIDE, CALCIUM CHLORIDE 600; 310; 30; 20 MG/100ML; MG/100ML; MG/100ML; MG/100ML
INJECTION, SOLUTION INTRAVENOUS CONTINUOUS
Status: DISCONTINUED | OUTPATIENT
Start: 2023-08-21 | End: 2023-08-21 | Stop reason: HOSPADM

## 2023-08-21 RX ORDER — ONDANSETRON 4 MG/1
4 TABLET, ORALLY DISINTEGRATING ORAL EVERY 30 MIN PRN
Status: DISCONTINUED | OUTPATIENT
Start: 2023-08-21 | End: 2023-08-21 | Stop reason: HOSPADM

## 2023-08-21 RX ORDER — FLUMAZENIL 0.1 MG/ML
0.2 INJECTION, SOLUTION INTRAVENOUS
Status: DISCONTINUED | OUTPATIENT
Start: 2023-08-21 | End: 2023-08-22 | Stop reason: HOSPADM

## 2023-08-21 RX ORDER — ONDANSETRON 2 MG/ML
4 INJECTION INTRAMUSCULAR; INTRAVENOUS
Status: DISCONTINUED | OUTPATIENT
Start: 2023-08-21 | End: 2023-08-21 | Stop reason: HOSPADM

## 2023-08-21 RX ORDER — ONDANSETRON 2 MG/ML
4 INJECTION INTRAMUSCULAR; INTRAVENOUS EVERY 30 MIN PRN
Status: DISCONTINUED | OUTPATIENT
Start: 2023-08-21 | End: 2023-08-22 | Stop reason: HOSPADM

## 2023-08-21 RX ORDER — NALOXONE HYDROCHLORIDE 0.4 MG/ML
0.2 INJECTION, SOLUTION INTRAMUSCULAR; INTRAVENOUS; SUBCUTANEOUS
Status: DISCONTINUED | OUTPATIENT
Start: 2023-08-21 | End: 2023-08-22 | Stop reason: HOSPADM

## 2023-08-21 RX ORDER — PROPOFOL 10 MG/ML
INJECTION, EMULSION INTRAVENOUS CONTINUOUS PRN
Status: DISCONTINUED | OUTPATIENT
Start: 2023-08-21 | End: 2023-08-21

## 2023-08-21 RX ORDER — HYDROMORPHONE HYDROCHLORIDE 1 MG/ML
0.2 INJECTION, SOLUTION INTRAMUSCULAR; INTRAVENOUS; SUBCUTANEOUS EVERY 5 MIN PRN
Status: DISCONTINUED | OUTPATIENT
Start: 2023-08-21 | End: 2023-08-21 | Stop reason: HOSPADM

## 2023-08-21 RX ORDER — ONDANSETRON 4 MG/1
4 TABLET, ORALLY DISINTEGRATING ORAL EVERY 6 HOURS PRN
Status: DISCONTINUED | OUTPATIENT
Start: 2023-08-21 | End: 2023-08-22 | Stop reason: HOSPADM

## 2023-08-21 RX ORDER — FENTANYL CITRATE 50 UG/ML
50 INJECTION, SOLUTION INTRAMUSCULAR; INTRAVENOUS EVERY 5 MIN PRN
Status: DISCONTINUED | OUTPATIENT
Start: 2023-08-21 | End: 2023-08-21 | Stop reason: HOSPADM

## 2023-08-21 RX ORDER — ONDANSETRON 2 MG/ML
4 INJECTION INTRAMUSCULAR; INTRAVENOUS EVERY 6 HOURS PRN
Status: DISCONTINUED | OUTPATIENT
Start: 2023-08-21 | End: 2023-08-22 | Stop reason: HOSPADM

## 2023-08-21 RX ORDER — LIDOCAINE HYDROCHLORIDE 20 MG/ML
INJECTION, SOLUTION INFILTRATION; PERINEURAL PRN
Status: DISCONTINUED | OUTPATIENT
Start: 2023-08-21 | End: 2023-08-21

## 2023-08-21 RX ORDER — ONDANSETRON 4 MG/1
4 TABLET, ORALLY DISINTEGRATING ORAL EVERY 30 MIN PRN
Status: DISCONTINUED | OUTPATIENT
Start: 2023-08-21 | End: 2023-08-22 | Stop reason: HOSPADM

## 2023-08-21 RX ADMIN — LIDOCAINE HYDROCHLORIDE 60 MG: 20 INJECTION, SOLUTION INFILTRATION; PERINEURAL at 11:28

## 2023-08-21 RX ADMIN — GLYCOPYRROLATE 0.2 MG: 0.2 INJECTION, SOLUTION INTRAMUSCULAR; INTRAVENOUS at 11:29

## 2023-08-21 RX ADMIN — SODIUM CHLORIDE, SODIUM LACTATE, POTASSIUM CHLORIDE, CALCIUM CHLORIDE: 600; 310; 30; 20 INJECTION, SOLUTION INTRAVENOUS at 11:25

## 2023-08-21 RX ADMIN — PROPOFOL 350 MCG/KG/MIN: 10 INJECTION, EMULSION INTRAVENOUS at 11:28

## 2023-08-21 RX ADMIN — PROPOFOL 40 MG: 10 INJECTION, EMULSION INTRAVENOUS at 11:30

## 2023-08-21 NOTE — ANESTHESIA CARE TRANSFER NOTE
Patient: Luli Hobbs    Procedure: Procedure(s):  Esophagoscopy, gastroscopy, duodenoscopy (EGD), combined with biopsies and dilation       Diagnosis: Esophageal dysphagia [R13.19]  Esophageal stricture [K22.2]  Diagnosis Additional Information: No value filed.    Anesthesia Type:   MAC     Note:    Oropharynx: oropharynx clear of all foreign objects  Level of Consciousness: awake  Oxygen Supplementation: room air    Independent Airway: airway patency satisfactory and stable  Dentition: dentition unchanged  Vital Signs Stable: post-procedure vital signs reviewed and stable    Patient transferred to: Phase II    Handoff Report: Identifed the Patient, Identified the Reponsible Provider, Reviewed the pertinent medical history, Discussed the surgical course, Reviewed Intra-OP anesthesia mangement and issues during anesthesia, Set expectations for post-procedure period and Allowed opportunity for questions and acknowledgement of understanding      Vitals:  Vitals Value Taken Time   /70 08/21/23 1155   Temp 36.6  C (97.9  F) 08/21/23 1155   Pulse 69 08/21/23 1155   Resp 16 08/21/23 1155   SpO2 95 % 08/21/23 1155       Electronically Signed By: CINDY Mcgraw CRNA  August 21, 2023  11:58 AM

## 2023-08-21 NOTE — ANESTHESIA POSTPROCEDURE EVALUATION
Patient: Luli Hobbs    Procedure: Procedure(s):  Esophagoscopy, gastroscopy, duodenoscopy (EGD), combined with biopsies and dilation       Anesthesia Type:  MAC    Note:  Disposition: Outpatient   Postop Pain Control: Uneventful            Sign Out: Well controlled pain   PONV: No   Neuro/Psych: Uneventful            Sign Out: Acceptable/Baseline neuro status   Airway/Respiratory: Uneventful            Sign Out: Acceptable/Baseline resp. status   CV/Hemodynamics: Uneventful            Sign Out: Acceptable CV status; No obvious hypovolemia; No obvious fluid overload   Other NRE: NONE   DID A NON-ROUTINE EVENT OCCUR? No           Last vitals:  Vitals Value Taken Time   /71 08/21/23 1202   Temp 36.1  C (97  F) 08/21/23 1202   Pulse 69 08/21/23 1202   Resp 18 08/21/23 1202   SpO2 95 % 08/21/23 1202       Electronically Signed By: Javad Weston MD  August 21, 2023  1:05 PM

## 2023-08-21 NOTE — H&P
Luli Hobbs  [id number]  female  73 year old      Reason for procedure/surgery: egd, dysphagia, prior stricture    Patient Active Problem List   Diagnosis    Malignant neoplasm of female breast (H)    Allergic rhinitis    Esophageal dysphagia    Esophageal stricture       Past Surgical History:    Past Surgical History:   Procedure Laterality Date    ABLATION SAPHENOUS VEIN W/ RFA Left 2019    CL AFF SURGICAL PATHOLOGY  1997    lipoma from abdomen removed    ESOPHAGOSCOPY, GASTROSCOPY, DUODENOSCOPY (EGD), COMBINED  2019    FOOT ARTHRODESIS, SUBTALAR      HC EXCISION BREAST LESION, OPEN >=1  2000    HC RECONSTR NOSE+SIVAKUMAR SEPTAL REPAIR  2003    cleaned out sinus passages    INSERT INTRACORONARY STENT Right 2018    Right Thigh    LUMPECTOMY BREAST      RELEASE CARPAL TUNNEL Bilateral     SEPTOPLASTY      TONSILLECTOMY      TONSILLECTOMY      [facility] FOOT/TOES SURGERY PROC UNLISTED  2000    bone spur removed, toe straightened    [facility] TOTAL HIP ARTHROPLASTY Right 4/28/2021    Procedure: RIGHT TOTAL HIP ARTHROPLASTY DIRECT ANTERIOR APPROACH;  Surgeon: Gomez Barnett MD;  Location: [facility];  Service: Orthopedics    [facility] NCS MOTOR W/O F-WAVE, EACH NERVE  2003    bilateral       Past Medical History:   Past Medical History:   Diagnosis Date    Allergic rhinitis     Allergic rhinitis, cause unspecified     Arthritis     Breast cancer (H) 01/01/2000    Esophageal stricture     Hypertension     Lipodermatosclerosis of left lower extremity     Lymphedema     Malignant neoplasm of breast (female), unspecified site 01/01/2000    had left lumpectomy and radiation    Morbid obesity (H)     Multiple environmental allergies     Autoimmune disorder causing hypersensitivity    Obesity     Peptic ulcer     Spondylolysis     lumbar    Varicose vein of leg        Social History:   Social History     Tobacco Use    Smoking status: Former     Packs/day: 1.50     Years: 2.00     Pack years: 3.00     Types: Cigarettes     Quit date:  1972     Years since quittin.6    Smokeless tobacco: Never   Substance Use Topics    Alcohol use: Yes     Alcohol/week: 14.0 standard drinks of alcohol     Types: 14 Standard drinks or equivalent per week     Comment: 1-2 glassesof wine daily.       Family History:   Family History   Problem Relation Age of Onset    Musculoskeletal Disorder Father         dementia    Cancer Maternal Grandfather         skin cancer       Allergies:   Allergies   Allergen Reactions    Codeine Nausea and Vomiting    Oxycodone Nausea     OK to take Tramadol       Active Medications:   Current Outpatient Medications   Medication Sig Dispense Refill    acetaminophen (TYLENOL) 325 MG tablet Take 325-650 mg by mouth 1 capsule daily.      CALCIUM 500 + D 500-200 MG-IU OR TABS one daily  0    cholecalciferol 25 MCG (1000 UT) TABS Take 2,000 Units by mouth      Ginkgo Biloba 120 MG TABS Take 2 tablets by mouth daily      HEMP OIL OR EXTRACT OR OTHER CBD CANNABINOID, NOT MEDICAL CANNABIS, Take 25 mg by mouth As needed      metoprolol succinate ER (TOPROL XL) 200 MG 24 hr tablet Take 200 mg by mouth      OMEGA 3 550 MG OR CAPS takes one daily  0    pantoprazole (PROTONIX) 40 MG EC tablet Take 40 mg by mouth As needed      rosuvastatin (CRESTOR) 5 MG tablet       ST FULLER WORT 350 MG OR CAPS unsure of mg strength, takes one daily 90 0    triamterene-HCTZ (MAXZIDE-25) 37.5-25 MG tablet Take 1 tablet by mouth every morning      Turmeric Curcumin 500 MG CAPS Take 1 capsule by mouth      VITAMIN E 200 UNIT OR CAPS takes one daily  0    ZYRTEC D ONE BID 28 0    MULTIVITAMIN/MULTIMINERAL TABS   OR 1 TABLET DAILY 30 0    traMADol (ULTRAM) 50 MG tablet Take 25-50 mg by mouth PRN         Systemic Review:   CONSTITUTIONAL: NEGATIVE for fever, chills, change in weight  ENT/MOUTH: NEGATIVE for ear, mouth and throat problems  RESP: NEGATIVE for significant cough or SOB  CV: NEGATIVE for chest pain, palpitations or peripheral edema    Physical  "Examination:   Vital Signs: BP (!) 151/103 (BP Location: Right arm)   Pulse 61   Temp 97  F (36.1  C) (Temporal)   Resp 18   Ht 1.6 m (5' 3\")   Wt 99.8 kg (220 lb)   SpO2 98%   BMI 38.97 kg/m    GENERAL: healthy, alert and no distress  NECK: no adenopathy, no asymmetry, masses, or scars  RESP: lungs clear to auscultation - no rales, rhonchi or wheezes  CV: regular rate and rhythm, normal S1 S2, no S3 or S4, no murmur, click or rub, no peripheral edema and peripheral pulses strong  ABDOMEN: soft, nontender, no hepatosplenomegaly, no masses and bowel sounds normal  MS: no gross musculoskeletal defects noted, no edema    Plan: Appropriate to proceed as scheduled.      Bk Dasilva DO  8/21/2023    PCP:  Rica Christianson    "

## 2023-08-21 NOTE — LETTER
"August 24, 2023      Luli Mcbridener  [address] [place] [id number]        Dear ,    We are writing to inform you of your test results.    The biopsy results were all normal.     Resulted Orders   Surgical Pathology Exam   Result Value Ref Range    Case Report       Surgical Pathology Report                         Case: [id number]                                  Authorizing Provider:  Bk Dasilva DO          Collected:           08/21/2023 11:36 AM          Ordering Location:     [facility] OR  Received:            08/21/2023 11:51 AM                                 [place]                                                                  Pathologist:           Aaron Almaraz DO                                                            Specimens:   A) - Other, Distal Esophagus Biopsies - EOE and Lichen Planus                                       B) - Other, Proximal Esophagus Biopsies  - EOE and Lichen Planus                           Final Diagnosis       A. ESOPHAGUS, DISTAL, BIOPSIES:  - Unremarkable squamous mucosa  - No evidence of eosinophilic esophagitis  - Lichenoid features are not present    B. ESOPHAGUS, PROXIMAL, BIOPSIES:  - Mild active esophagitis  - No evidence of eosinophilic esophagitis  - Lichenoid features are not present        Clinical Information       Esophageal stricture r/o EOE and lichen planus      Gross Description       A(1). Other, Distal Esophagus Biopsies - EOE and Lichen Planus:  The specimen is received in formalin with proper patient identification, labeled \"distal esophagus biopsies\".  The specimen consists of 4 pieces of white-tan soft tissue ranging from 0.2 to 0.6 cm in greatest dimension.  It is wrapped and entirely submitted in cassette A1.   B(2). Other, Proximal Esophagus Biopsies  - EOE and Lichen Planus:  The specimen is received in formalin with proper patient identification, labeled \"proximal esophagus biopsies\".  The " specimen consists of 4 pieces of white-tan soft tissue ranging from 0.3 to 0.5 cm in greatest dimension.  It is wrapped and entirely submitted in cassette B1.       Microscopic Description       Microscopic examination was performed.        Performing Labs       The technical component of this testing was completed at [facility]      Case Images         If you have any questions or concerns, please call the clinic at the number listed above.       Sincerely,      Bk Dasilva, DO

## 2023-08-23 LAB
PATH REPORT.COMMENTS IMP SPEC: NORMAL
PATH REPORT.COMMENTS IMP SPEC: NORMAL
PATH REPORT.FINAL DX SPEC: NORMAL
PATH REPORT.GROSS SPEC: NORMAL
PATH REPORT.MICROSCOPIC SPEC OTHER STN: NORMAL
PATH REPORT.RELEVANT HX SPEC: NORMAL
PHOTO IMAGE: NORMAL

## 2023-08-31 NOTE — ADDENDUM NOTE
Addendum  created 08/31/23 0719 by Javad Weston MD    Attestation recorded in Intraprocedure, Intraprocedure Attestations filed

## 2023-10-12 ENCOUNTER — LAB REQUISITION (OUTPATIENT)
Dept: LAB | Facility: CLINIC | Age: 73
End: 2023-10-12

## 2023-10-12 VITALS
SYSTOLIC BLOOD PRESSURE: 151 MMHG | HEART RATE: 72 BPM | OXYGEN SATURATION: 96 % | DIASTOLIC BLOOD PRESSURE: 85 MMHG | RESPIRATION RATE: 18 BRPM | TEMPERATURE: 97.3 F

## 2023-10-12 DIAGNOSIS — Z11.1 ENCOUNTER FOR SCREENING FOR RESPIRATORY TUBERCULOSIS: ICD-10-CM

## 2023-10-12 RX ORDER — CELECOXIB 100 MG/1
100 CAPSULE ORAL 2 TIMES DAILY
COMMUNITY

## 2023-10-12 RX ORDER — ASPIRIN 81 MG/1
81 TABLET ORAL 2 TIMES DAILY
COMMUNITY

## 2023-10-12 RX ORDER — CEFADROXIL 500 MG/1
500 CAPSULE ORAL 2 TIMES DAILY
COMMUNITY
Start: 2023-10-12 | End: 2023-10-22

## 2023-10-12 RX ORDER — VITAMIN B COMPLEX
1 CAPSULE ORAL DAILY
COMMUNITY

## 2023-10-12 RX ORDER — SENNOSIDES 8.6 MG
1 TABLET ORAL 2 TIMES DAILY
COMMUNITY

## 2023-10-12 RX ORDER — CYANOCOBALAMIN (VITAMIN B-12) 500 MCG
400 LOZENGE ORAL DAILY
COMMUNITY

## 2023-10-12 RX ORDER — LANOLIN ALCOHOL/MO/W.PET/CERES
1.5 CREAM (GRAM) TOPICAL AT BEDTIME
COMMUNITY

## 2023-10-12 RX ORDER — TRAMADOL HYDROCHLORIDE 50 MG/1
50 TABLET ORAL EVERY 6 HOURS PRN
COMMUNITY
End: 2023-10-19

## 2023-10-12 RX ORDER — CETIRIZINE HYDROCHLORIDE, PSEUDOEPHEDRINE HYDROCHLORIDE 5; 120 MG/1; MG/1
1 TABLET, FILM COATED, EXTENDED RELEASE ORAL DAILY
COMMUNITY

## 2023-10-12 RX ORDER — ACETAMINOPHEN 500 MG
1000 TABLET ORAL EVERY 6 HOURS
COMMUNITY

## 2023-10-13 ENCOUNTER — TRANSITIONAL CARE UNIT VISIT (OUTPATIENT)
Dept: GERIATRICS | Facility: CLINIC | Age: 73
End: 2023-10-13
Payer: COMMERCIAL

## 2023-10-13 DIAGNOSIS — E87.1 HYPONATREMIA: ICD-10-CM

## 2023-10-13 DIAGNOSIS — E78.5 HYPERLIPIDEMIA LDL GOAL <100: ICD-10-CM

## 2023-10-13 DIAGNOSIS — M17.12 PRIMARY OSTEOARTHRITIS OF LEFT KNEE: Primary | ICD-10-CM

## 2023-10-13 DIAGNOSIS — Z96.652 S/P TOTAL KNEE ARTHROPLASTY, LEFT: ICD-10-CM

## 2023-10-13 DIAGNOSIS — I10 BENIGN ESSENTIAL HYPERTENSION: ICD-10-CM

## 2023-10-13 DIAGNOSIS — K21.00 GASTROESOPHAGEAL REFLUX DISEASE WITH ESOPHAGITIS, UNSPECIFIED WHETHER HEMORRHAGE: ICD-10-CM

## 2023-10-13 PROCEDURE — 86481 TB AG RESPONSE T-CELL SUSP: CPT | Performed by: INTERNAL MEDICINE

## 2023-10-13 PROCEDURE — 36415 COLL VENOUS BLD VENIPUNCTURE: CPT | Performed by: INTERNAL MEDICINE

## 2023-10-13 PROCEDURE — P9604 ONE-WAY ALLOW PRORATED TRIP: HCPCS | Performed by: INTERNAL MEDICINE

## 2023-10-13 PROCEDURE — 99310 SBSQ NF CARE HIGH MDM 45: CPT | Performed by: PHYSICIAN ASSISTANT

## 2023-10-13 NOTE — PROGRESS NOTES
[facility] GERIATRICS    PRIMARY CARE PROVIDER AND CLINIC:  MEAGHAN VOGEL, [address] [facility] [address]  Chief Complaint   Patient presents with    [facility] F/U      [place] Medical Record Number:  [id number]  Place of Service where encounter took place:  [facility] ([facility]) [[id number]]    HPI:    Patient is a 73-year-old female with past medical history of obesity, hypertension, allergic rhinitis, GERD, and end-stage osteoarthritis of left knee who was admitted at [facility] from 10/11-10/12, 2023 after undergoing previously scheduled left TKA on 10/11/2023.  Surgery and postoperative course were uncomplicated.  Postoperatively, patient was recommended to discharge to U as she lives alone with limited support in immediate postoperative period.  She is admitted to [facility] for ongoing rehab, medical management.    Patient is presently evaluated as initial visit.  She is sitting up in chair at bedside, reports that she is doing quite well.  Denies significant pain to the knee or leg.  Has been working in therapies on ambulation, feels she is progressing well.  Is hopeful for a short stay here at U.  Does admit to mild constipation, appears to be improving on admission.  No further concerns today.    CODE STATUS/ADVANCE DIRECTIVES DISCUSSION:  No Order  CPR/Full code   ALLERGIES:   Allergies   Allergen Reactions    Codeine Nausea and Vomiting    Morphine     Oxycodone Nausea     OK to take Tramadol      PAST MEDICAL HISTORY:   Past Medical History:   Diagnosis Date    Allergic rhinitis     Allergic rhinitis, cause unspecified     Arthritis     Breast cancer (H) 01/01/2000    Esophageal stricture     Hypertension     Lipodermatosclerosis of left lower extremity     Lymphedema     Malignant neoplasm of breast (female), unspecified site 01/01/2000    had left lumpectomy and radiation    Morbid obesity (H)     Multiple environmental allergies     Autoimmune disorder causing  hypersensitivity    Obesity     Peptic ulcer     Spondylolysis     lumbar    Varicose vein of leg       PAST SURGICAL HISTORY:   has a past surgical history that includes EXCISION BREAST LESION, OPEN >=1 (2000); tonsillectomy; FOOT/TOES SURGERY PROC UNLISTED (2000); SURGICAL PATHOLOGY (1997); RECONSTR NOSE+SIVAKUMAR SEPTAL REPAIR (2003); NCS MOTOR W/O F-WAVE, EACH NERVE (2003); Tonsillectomy; Lumpectomy breast; Foot Arthrodesis, Subtalar; Release carpal tunnel (Bilateral); Insert Intracoronary Stent (Right, 2018); Septoplasty; Esophagoscopy, gastroscopy, duodenoscopy (EGD), combined (2019); Ablation Saphenous Vein W/ Rfa (Left, 2019); TOTAL HIP ARTHROPLASTY (Right, 4/28/2021); and Esophagoscopy, gastroscopy, duodenoscopy (EGD), combined (N/A, 8/21/2023).  FAMILY HISTORY: family history includes Cancer in her maternal grandfather; Musculoskeletal Disorder in her father.  SOCIAL HISTORY:   reports that she quit smoking about 51 years ago. Her smoking use included cigarettes. She has a 3.00 pack-year smoking history. She has never used smokeless tobacco. She reports current alcohol use of about 14.0 standard drinks of alcohol per week. She reports that she does not use drugs.  Patient's living condition: lives alone    Post Discharge Medication Reconciliation Status:   MED REC REQUIRED  Post Medication Reconciliation Status: discharge medications reconciled, continue medications without change       Current Outpatient Medications   Medication Sig    acetaminophen (TYLENOL) 500 MG tablet Take 1,000 mg by mouth every 6 hours    aspirin 81 MG EC tablet Take 81 mg by mouth 2 times daily    B Complex CAPS Take 1 capsule by mouth daily    cefadroxil (DURICEF) 500 MG capsule Take 500 mg by mouth 2 times daily    celecoxib (CELEBREX) 100 MG capsule Take 100 mg by mouth 2 times daily    cetirizine-pseudoePHEDrine ER (ZYRTEC-D) 5-120 MG 12 hr tablet Take 1 tablet by mouth daily    cholecalciferol 25 MCG (1000 UT) TABS Take 2,000 Units  by mouth    Ginkgo Biloba 120 MG TABS Take 2 tablets by mouth daily    melatonin 3 MG tablet Take 1.5 mg by mouth at bedtime    metoprolol succinate ER (TOPROL XL) 200 MG 24 hr tablet Take 200 mg by mouth daily    pantoprazole (PROTONIX) 40 MG EC tablet Take 40 mg by mouth As needed    rosuvastatin (CRESTOR) 5 MG tablet Take 5 mg by mouth daily    sennosides (SENOKOT) 8.6 MG tablet Take 1 tablet by mouth 2 times daily    traMADol (ULTRAM) 50 MG tablet Take 50 mg by mouth every 6 hours as needed for severe pain    triamterene-HCTZ (MAXZIDE-25) 37.5-25 MG tablet Take 1 tablet by mouth every morning    vitamin E 400 units TABS Take 400 Units by mouth daily    Zinc-Magnesium Aspart-Vit B6 -3.83 MG CAPS Take 1 capsule by mouth daily    acetaminophen (TYLENOL) 325 MG tablet Take 325-650 mg by mouth 1 capsule daily. (Patient not taking: Reported on 10/12/2023)    CALCIUM 500 + D 500-200 MG-IU OR TABS one daily (Patient not taking: Reported on 10/12/2023)    HEMP OIL OR EXTRACT OR OTHER CBD CANNABINOID, NOT MEDICAL CANNABIS, Take 25 mg by mouth As needed (Patient not taking: Reported on 10/12/2023)    MULTIVITAMIN/MULTIMINERAL TABS   OR 1 TABLET DAILY    OMEGA 3 550 MG OR CAPS takes one daily (Patient not taking: Reported on 10/12/2023)    ST JOHNS WORT 350 MG OR CAPS unsure of mg strength, takes one daily (Patient not taking: Reported on 10/12/2023)    traMADol (ULTRAM) 50 MG tablet Take 25-50 mg by mouth PRN    Turmeric Curcumin 500 MG CAPS Take 1 capsule by mouth (Patient not taking: Reported on 10/12/2023)    VITAMIN E 200 UNIT OR CAPS takes one daily (Patient not taking: Reported on 10/12/2023)    ZYRTEC D ONE BID (Patient not taking: Reported on 10/12/2023)     No current facility-administered medications for this visit.       ROS:  4 point ROS including Respiratory, CV, GI and , other than that noted in the HPI,  is negative    Vitals:  BP (!) 151/85   Pulse 72   Temp 97.3  F (36.3  C)   Resp 18   SpO2  96%   Exam:  GEN: well-developed, well-nourished, appears comfortable  HEENT: NCAT, EOM intact bilaterally, sclera clear, conjunctiva normal, nose & mouth patent, mucous membranes moist  CHEST: lungs CTA bilaterally, no increased work of breathing, no wheeze, crackles, rhonchi  HEART: RRR, S1 & S2, no murmur  ABD: soft, nontender, nondistended, no guarding or rigidity, +BS in all 4 quadrants  MSK: AROM bilateral UE/LE, incision to L knee c/d/I, scant amount of drainage to dressing, pedal & radial pulses 2+ bilaterally  NEURO: awake, alert, oriented to name, place, and time. CN II-XII grossly intact. Sensation grossly intact to light touch.   SKIN: warm & dry without rash, no pedal edema    Lab/Diagnostic data: most recent values reviewed in epic    ASSESSMENT/PLAN:    Osteoarthritis of left knee s/p TKA 10/11/2023  Impaired mobility and ADLs  Postoperative course uncomplicated.  Pain is controlled at this time.  -WBAT to LLE  -Pain control with as needed Tylenol, tramadol  -Continues on scheduled celecoxib 100mg BID  -Infection proph with cefadroxil 500mg BID until 10/22  -PT/OT evaluations  -SW for discharge planning    Hypertension  Chronic regimen of Toprol- mg/day, Maxide 37.5-25 mg/day.  Had not taken medications for multiple days prior to surgery, possible medication noncompliance. Maxzide held while inpatient due to soft BPs, resumed at discharge.  -Continued on toprol, maxzide  -Monitor BP trend    Hyponatremia  Sodium 133 on preop evaluation.  Possibly due to Maxide.  -BMP on 10/16    Hyperlipidemia  Chronic and stable  -Continues on Crestor 5 mg daily    GERD  History of esophageal stricture  Last dilation in summer 2023, prescribed pantoprazole however takes it intermittently.  -Continues on pantoprazole 40 mg daily PRN    Total time spent with patient visit at the skilled nursing facility was 45 min including patient visit and review of past records.     Electronically signed by:  Gerardo Diehl  KJ

## 2023-10-15 ENCOUNTER — LAB REQUISITION (OUTPATIENT)
Dept: LAB | Facility: CLINIC | Age: 73
End: 2023-10-15

## 2023-10-15 DIAGNOSIS — D64.9 ANEMIA, UNSPECIFIED: ICD-10-CM

## 2023-10-15 DIAGNOSIS — E87.1 HYPO-OSMOLALITY AND HYPONATREMIA: ICD-10-CM

## 2023-10-16 ENCOUNTER — TELEPHONE (OUTPATIENT)
Dept: GERIATRICS | Facility: CLINIC | Age: 73
End: 2023-10-16
Payer: COMMERCIAL

## 2023-10-16 LAB
ANION GAP SERPL CALCULATED.3IONS-SCNC: 11 MMOL/L (ref 7–15)
BUN SERPL-MCNC: 13.7 MG/DL (ref 8–23)
CALCIUM SERPL-MCNC: 8.8 MG/DL (ref 8.8–10.2)
CHLORIDE SERPL-SCNC: 98 MMOL/L (ref 98–107)
CREAT SERPL-MCNC: 0.65 MG/DL (ref 0.51–0.95)
DEPRECATED HCO3 PLAS-SCNC: 23 MMOL/L (ref 22–29)
EGFRCR SERPLBLD CKD-EPI 2021: >90 ML/MIN/1.73M2
ERYTHROCYTE [DISTWIDTH] IN BLOOD BY AUTOMATED COUNT: 13.8 % (ref 10–15)
GAMMA INTERFERON BACKGROUND BLD IA-ACNC: 0 IU/ML
GLUCOSE SERPL-MCNC: 99 MG/DL (ref 70–99)
HCT VFR BLD AUTO: 34.2 % (ref 35–47)
HGB BLD-MCNC: 11 G/DL (ref 11.7–15.7)
M TB IFN-G BLD-IMP: NEGATIVE
M TB IFN-G CD4+ BCKGRND COR BLD-ACNC: 0.94 IU/ML
MCH RBC QN AUTO: 28.2 PG (ref 26.5–33)
MCHC RBC AUTO-ENTMCNC: 32.2 G/DL (ref 31.5–36.5)
MCV RBC AUTO: 88 FL (ref 78–100)
MITOGEN IGNF BCKGRD COR BLD-ACNC: 0 IU/ML
MITOGEN IGNF BCKGRD COR BLD-ACNC: 0 IU/ML
PLATELET # BLD AUTO: 225 10E3/UL (ref 150–450)
POTASSIUM SERPL-SCNC: 3.8 MMOL/L (ref 3.4–5.3)
QUANTIFERON MITOGEN: 0.94 IU/ML
QUANTIFERON NIL TUBE: 0 IU/ML
QUANTIFERON TB1 TUBE: 0 IU/ML
QUANTIFERON TB2 TUBE: 0
RBC # BLD AUTO: 3.9 10E6/UL (ref 3.8–5.2)
SODIUM SERPL-SCNC: 132 MMOL/L (ref 135–145)
WBC # BLD AUTO: 7.6 10E3/UL (ref 4–11)

## 2023-10-16 PROCEDURE — P9604 ONE-WAY ALLOW PRORATED TRIP: HCPCS | Performed by: INTERNAL MEDICINE

## 2023-10-16 PROCEDURE — 85027 COMPLETE CBC AUTOMATED: CPT | Performed by: INTERNAL MEDICINE

## 2023-10-16 PROCEDURE — 80048 BASIC METABOLIC PNL TOTAL CA: CPT | Performed by: INTERNAL MEDICINE

## 2023-10-16 PROCEDURE — 36415 COLL VENOUS BLD VENIPUNCTURE: CPT | Performed by: INTERNAL MEDICINE

## 2023-10-16 NOTE — TELEPHONE ENCOUNTER
Order relayed to facility nurse, Indu.       ----- Message from Gerardo Diehl PA-C sent at 10/16/2023  9:55 AM CDT -----  Repeat on 10/18 dx hyponatremia      ----- Message -----  From: Lab, Background User  Sent: 10/16/2023   8:46 AM CDT  To: Gerardo Diehl PA-C

## 2023-10-17 ENCOUNTER — LAB REQUISITION (OUTPATIENT)
Dept: LAB | Facility: CLINIC | Age: 73
End: 2023-10-17

## 2023-10-17 ENCOUNTER — TRANSITIONAL CARE UNIT VISIT (OUTPATIENT)
Dept: GERIATRICS | Facility: CLINIC | Age: 73
End: 2023-10-17
Payer: COMMERCIAL

## 2023-10-17 VITALS
SYSTOLIC BLOOD PRESSURE: 146 MMHG | HEIGHT: 63 IN | DIASTOLIC BLOOD PRESSURE: 91 MMHG | HEART RATE: 75 BPM | TEMPERATURE: 98.2 F | RESPIRATION RATE: 16 BRPM | BODY MASS INDEX: 40.29 KG/M2 | WEIGHT: 227.4 LBS | OXYGEN SATURATION: 99 %

## 2023-10-17 DIAGNOSIS — E66.01 SEVERE OBESITY (BMI >= 40) (H): Primary | ICD-10-CM

## 2023-10-17 DIAGNOSIS — I10 ESSENTIAL (PRIMARY) HYPERTENSION: ICD-10-CM

## 2023-10-17 DIAGNOSIS — M17.12 PRIMARY OSTEOARTHRITIS OF LEFT KNEE: ICD-10-CM

## 2023-10-17 DIAGNOSIS — K21.00 GASTROESOPHAGEAL REFLUX DISEASE WITH ESOPHAGITIS, UNSPECIFIED WHETHER HEMORRHAGE: ICD-10-CM

## 2023-10-17 DIAGNOSIS — Z96.652 S/P TOTAL KNEE ARTHROPLASTY, LEFT: ICD-10-CM

## 2023-10-17 DIAGNOSIS — I10 BENIGN ESSENTIAL HYPERTENSION: ICD-10-CM

## 2023-10-17 DIAGNOSIS — E87.1 HYPONATREMIA: ICD-10-CM

## 2023-10-17 DIAGNOSIS — E78.5 HYPERLIPIDEMIA LDL GOAL <100: ICD-10-CM

## 2023-10-17 PROCEDURE — 99316 NF DSCHRG MGMT 30 MIN+: CPT | Performed by: PHYSICIAN ASSISTANT

## 2023-10-17 NOTE — PROGRESS NOTES
[facility] GERIATRICS DISCHARGE SUMMARY  PATIENT'S NAME: Luli Hbobs  YOB: 1950  MEDICAL RECORD NUMBER:  [id number]  Place of Service where encounter took place:  [facility] ([facility]) [[id number]]    PRIMARY CARE PROVIDER AND CLINIC RESPONSIBLE AFTER TRANSFER:   MEAGHAN VOGEL, [address] [facility] [address]    Non-FMG Provider     Transferring providers: Gerardo Diehl PA-C, Gaviota Mckee MD  Recent Hospitalization/ED:  Hospital  [facility]  stay 10/11/23 to 10/12/23.  Date of SNF Admission:  10/12/23  Date of SNF (anticipated) Discharge:  10/18/23  Discharged to: previous independent home  Cognitive Scores:  na  Physical Function:  ambulating with FWW  DME: SNF  coordinating DME needs     CODE STATUS/ADVANCE DIRECTIVES DISCUSSION:  No Order   ALLERGIES: Codeine, Morphine, and Oxycodone    NURSING FACILITY COURSE     Summary of nursing facility stay:   Patient is a 73-year-old female with past medical history of obesity, hypertension, allergic rhinitis, GERD, and end-stage osteoarthritis of left knee who was admitted at [facility] from 10/11-10/12, 2023 after undergoing previously scheduled left TKA on 10/11/2023.  Surgery and postoperative course were uncomplicated.  Postoperatively, patient was recommended to discharge to U as she lives alone with limited support in immediate postoperative period.  She is admitted to [facility] for ongoing rehab, medical management.     Osteoarthritis of left knee s/p TKA 10/11/2023  Impaired mobility and ADLs  Postoperative course uncomplicated.  Pain is controlled at this time.  -WBAT to LLE  -Pain control with as needed Tylenol, tramadol  -Continues on scheduled celecoxib 100mg BID  -Infection proph with cefadroxil 500mg BID until 10/22  -PT/OT      Hypertension  Chronic regimen of Toprol- mg/day, Maxide 37.5-25 mg/day.  Had not taken medications for multiple days prior to surgery,  possible medication noncompliance. Maxzide held while inpatient due to soft BPs, resumed at discharge.  -Continued on toprol, maxzide  -Monitor BP trend     Hyponatremia, resolved  Sodium 133 on preop evaluation.  Possibly due to Maxide. Resolved on repeat.     Hyperlipidemia  Chronic and stable  -Continues on Crestor 5 mg daily     GERD  History of esophageal stricture  Last dilation in summer 2023, prescribed pantoprazole however takes it intermittently.  -Continues on pantoprazole 40 mg daily PRN    Discharge Medications:  MED REC REQUIRED  Post Medication Reconciliation Status: patient was not discharged from an inpatient facility or TCU       Current Outpatient Medications   Medication Sig Dispense Refill    traMADol (ULTRAM) 50 MG tablet Take 1 tablet (50 mg) by mouth every 6 hours as needed for severe pain 30 tablet 0    acetaminophen (TYLENOL) 325 MG tablet Take 325-650 mg by mouth 1 capsule daily. (Patient not taking: Reported on 10/12/2023)      acetaminophen (TYLENOL) 500 MG tablet Take 1,000 mg by mouth every 6 hours      aspirin 81 MG EC tablet Take 81 mg by mouth 2 times daily      B Complex CAPS Take 1 capsule by mouth daily      CALCIUM 500 + D 500-200 MG-IU OR TABS one daily (Patient not taking: Reported on 10/12/2023)  0    cefadroxil (DURICEF) 500 MG capsule Take 500 mg by mouth 2 times daily      celecoxib (CELEBREX) 100 MG capsule Take 100 mg by mouth 2 times daily      cetirizine-pseudoePHEDrine ER (ZYRTEC-D) 5-120 MG 12 hr tablet Take 1 tablet by mouth daily      cholecalciferol 25 MCG (1000 UT) TABS Take 2,000 Units by mouth      Ginkgo Biloba 120 MG TABS Take 2 tablets by mouth daily      HEMP OIL OR EXTRACT OR OTHER CBD CANNABINOID, NOT MEDICAL CANNABIS, Take 25 mg by mouth As needed (Patient not taking: Reported on 10/12/2023)      melatonin 3 MG tablet Take 1.5 mg by mouth at bedtime      metoprolol succinate ER (TOPROL XL) 200 MG 24 hr tablet Take 200 mg by mouth daily       "MULTIVITAMIN/MULTIMINERAL TABS   OR 1 TABLET DAILY 30 0    OMEGA 3 550 MG OR CAPS takes one daily (Patient not taking: Reported on 10/12/2023)  0    pantoprazole (PROTONIX) 40 MG EC tablet Take 40 mg by mouth As needed      rosuvastatin (CRESTOR) 5 MG tablet Take 5 mg by mouth daily      sennosides (SENOKOT) 8.6 MG tablet Take 1 tablet by mouth 2 times daily      ST FULLER WORT 350 MG OR CAPS unsure of mg strength, takes one daily (Patient not taking: Reported on 10/12/2023) 90 0    traMADol (ULTRAM) 50 MG tablet Take 25-50 mg by mouth PRN      triamterene-HCTZ (MAXZIDE-25) 37.5-25 MG tablet Take 1 tablet by mouth every morning      Turmeric Curcumin 500 MG CAPS Take 1 capsule by mouth (Patient not taking: Reported on 10/12/2023)      VITAMIN E 200 UNIT OR CAPS takes one daily (Patient not taking: Reported on 10/12/2023)  0    vitamin E 400 units TABS Take 400 Units by mouth daily      Zinc-Magnesium Aspart-Vit B6 -3.83 MG CAPS Take 1 capsule by mouth daily      ZYRTEC D ONE BID (Patient not taking: Reported on 10/12/2023) 28 0        Controlled medications:   Medication tramadol electronically prescribed to home pharmacy     Past Medical History:   Past Medical History:   Diagnosis Date    Allergic rhinitis     Allergic rhinitis, cause unspecified     Arthritis     Breast cancer (H) 01/01/2000    Esophageal stricture     Hypertension     Lipodermatosclerosis of left lower extremity     Lymphedema     Malignant neoplasm of breast (female), unspecified site 01/01/2000    had left lumpectomy and radiation    Morbid obesity (H)     Multiple environmental allergies     Autoimmune disorder causing hypersensitivity    Obesity     Peptic ulcer     Spondylolysis     lumbar    Varicose vein of leg      Physical Exam:   Vitals: BP (!) 146/91   Pulse 75   Temp 98.2  F (36.8  C)   Resp 16   Ht 1.6 m (5' 3\")   Wt 103.1 kg (227 lb 6.4 oz)   SpO2 99%   BMI 40.28 kg/m    BMI: Body mass index is 40.28 kg/m .  GEN: " well-developed, well-nourished, appears comfortable  HEENT: NCAT, EOM intact bilaterally, sclera clear, conjunctiva normal, nose & mouth patent, mucous membranes moist  CHEST: lungs CTA bilaterally, no increased work of breathing, no wheeze, crackles, rhonchi  HEART: RRR, S1 & S2, no murmur  ABD: soft, nontender, nondistended, no guarding or rigidity, +BS in all 4 quadrants  MSK: AROM bilateral UE/LE, incision to L knee c/d/I, scant amount of drainage to dressing, pedal & radial pulses 2+ bilaterally  NEURO: awake, alert, oriented to name, place, and time. CN II-XII grossly intact. Sensation grossly intact to light touch.   SKIN: warm & dry without rash, no pedal edema    SNF labs: Most Recent 3 CBC's:  Recent Labs   Lab Test 10/16/23  0724 04/28/21  1629   WBC 7.6  --    HGB 11.0* 11.7*   MCV 88  --      --      Most Recent 3 BMP's:  Recent Labs   Lab Test 10/18/23  0828 10/16/23  0724    132*   POTASSIUM 4.2 3.8   CHLORIDE 98 98   CO2 29 23   BUN 17.9 13.7   CR 0.76 0.65   ANIONGAP 9 11   KEISHA 9.4 8.8   GLC 87 99       DISCHARGE PLAN:  Follow up labs: No labs orders/due  Medical Follow Up:      Follow up with primary care provider in 1-2 weeks, orthopedics as directed  Current [place] scheduled appointments:     Discharge Services: Home Care:  Occupational Therapy, Physical Therapy, Registered Nurse, and Home Health Aide  Discharge Instructions Verbalized to Patient at Discharge:   Notify your surgeon if you have increased redness, swelling, tenderness, or drainage at your incision site.     TOTAL DISCHARGE TIME:   Greater than 30 minutes  Electronically signed by:  Gerardo Diehl PA-C     Documentation of Face to Face and Certification for Home Health Services    I certify that services are/were furnished while this patient was under the care of a physician and that a physician or an allowed non-physician practitioner (NPP), had a face-to-face encounter that meets the physician face-to-face encounter  requirements. The encounter was in whole, or in part, related to the primary reason for home health. The patient is confined to his/her home and needs intermittent skilled nursing, physical therapy, speech-language pathology, or the continued need for occupational therapy. A plan of care has been established by a physician and is periodically reviewed by a physician.  Date of Face-to-Face Encounter: 10/17/2023.    I certify that, based on my findings, the following services are medically necessary home health services: Nursing, Occupational Therapy, and Physical Therapy.    My clinical findings support the need for the above skilled services because: Requires assistance of another person or specialized equipment to access medical services because patient: Range of motion limitations prevents ability to exit home safely...    Patient to re-establish plan of care with their PCP within 7-10 days after leaving the facility to reestablish care.  Medicare certified [facility] provider: Gerardo Diehl PA-C  Date: October 20, 2023

## 2023-10-18 ENCOUNTER — DOCUMENTATION ONLY (OUTPATIENT)
Dept: OTHER | Facility: CLINIC | Age: 73
End: 2023-10-18
Payer: COMMERCIAL

## 2023-10-18 LAB
ANION GAP SERPL CALCULATED.3IONS-SCNC: 9 MMOL/L (ref 7–15)
BUN SERPL-MCNC: 17.9 MG/DL (ref 8–23)
CALCIUM SERPL-MCNC: 9.4 MG/DL (ref 8.8–10.2)
CHLORIDE SERPL-SCNC: 98 MMOL/L (ref 98–107)
CREAT SERPL-MCNC: 0.76 MG/DL (ref 0.51–0.95)
DEPRECATED HCO3 PLAS-SCNC: 29 MMOL/L (ref 22–29)
EGFRCR SERPLBLD CKD-EPI 2021: 81 ML/MIN/1.73M2
GLUCOSE SERPL-MCNC: 87 MG/DL (ref 70–99)
POTASSIUM SERPL-SCNC: 4.2 MMOL/L (ref 3.4–5.3)
SODIUM SERPL-SCNC: 136 MMOL/L (ref 135–145)

## 2023-10-18 PROCEDURE — 80048 BASIC METABOLIC PNL TOTAL CA: CPT | Performed by: PHYSICIAN ASSISTANT

## 2023-10-18 PROCEDURE — 36415 COLL VENOUS BLD VENIPUNCTURE: CPT | Performed by: PHYSICIAN ASSISTANT

## 2023-10-18 PROCEDURE — P9604 ONE-WAY ALLOW PRORATED TRIP: HCPCS | Performed by: PHYSICIAN ASSISTANT

## 2023-10-19 DIAGNOSIS — M17.12 PRIMARY OSTEOARTHRITIS OF LEFT KNEE: Primary | ICD-10-CM

## 2023-10-19 RX ORDER — TRAMADOL HYDROCHLORIDE 50 MG/1
50 TABLET ORAL EVERY 6 HOURS PRN
Qty: 30 TABLET | Refills: 0 | Status: SHIPPED | OUTPATIENT
Start: 2023-10-19

## 2023-10-19 RX ORDER — TRAMADOL HYDROCHLORIDE 50 MG/1
50 TABLET ORAL EVERY 6 HOURS PRN
Qty: 10 TABLET | Refills: 0 | Status: SHIPPED | OUTPATIENT
Start: 2023-10-19 | End: 2023-10-19

## 2023-10-20 PROBLEM — E66.01 SEVERE OBESITY (BMI >= 40) (H): Status: ACTIVE | Noted: 2023-10-20

## (undated) DEVICE — SOL WATER IRRIG 500ML BOTTLE 2F7113

## (undated) DEVICE — ENDO BITE BLOCK ADULT OMNI-BLOC

## (undated) DEVICE — SUCTION CATH AIRLIFE TRI-FLO W/CONTROL PORT 14FR  T60C

## (undated) DEVICE — SUCTION MANIFOLD NEPTUNE 2 SYS 1 PORT 702-025-000

## (undated) DEVICE — GOWN IMPERVIOUS 2XL BLUE

## (undated) DEVICE — KIT ENDO TURNOVER/PROCEDURE CARRY-ON 101822

## (undated) DEVICE — TUBING SUCTION MEDI-VAC 1/4"X20' N620A

## (undated) DEVICE — SPECIMEN CONTAINER 3OZ W/FORMALIN 59901

## (undated) DEVICE — GLOVE EXAM NITRILE LG PF LATEX FREE 5064

## (undated) DEVICE — SYR 30ML SLIP TIP W/O NDL 302833

## (undated) DEVICE — CATH BALLOON MERIT ESOPH FIVE-STAGE 17X21MMX180CM EX18

## (undated) DEVICE — ENDO FORCEP BX CAPTURA PRO SPIKE G50696